# Patient Record
Sex: MALE | Race: WHITE | Employment: FULL TIME | URBAN - METROPOLITAN AREA
[De-identification: names, ages, dates, MRNs, and addresses within clinical notes are randomized per-mention and may not be internally consistent; named-entity substitution may affect disease eponyms.]

---

## 2017-01-03 ENCOUNTER — TELEPHONE (OUTPATIENT)
Dept: CARDIOLOGY CLINIC | Age: 57
End: 2017-01-03

## 2017-01-03 RX ORDER — ROSUVASTATIN CALCIUM 5 MG/1
5 TABLET, COATED ORAL DAILY
Qty: 30 TABLET | Refills: 11 | Status: SHIPPED | OUTPATIENT
Start: 2017-01-03 | End: 2017-01-04 | Stop reason: SDUPTHER

## 2017-01-04 RX ORDER — PRASUGREL 10 MG/1
10 TABLET, FILM COATED ORAL DAILY
Qty: 30 TABLET | Refills: 11 | Status: SHIPPED | OUTPATIENT
Start: 2017-01-04 | End: 2018-01-06 | Stop reason: SDUPTHER

## 2017-01-04 RX ORDER — ROSUVASTATIN CALCIUM 5 MG/1
5 TABLET, COATED ORAL DAILY
Qty: 30 TABLET | Refills: 11 | Status: SHIPPED | OUTPATIENT
Start: 2017-01-04 | End: 2017-01-05

## 2017-01-05 ENCOUNTER — TELEPHONE (OUTPATIENT)
Dept: CARDIOLOGY CLINIC | Age: 57
End: 2017-01-05

## 2017-01-05 RX ORDER — SIMVASTATIN 40 MG
40 TABLET ORAL NIGHTLY
Qty: 30 TABLET | Refills: 11
Start: 2017-01-05 | End: 2017-01-06 | Stop reason: SDUPTHER

## 2017-01-06 RX ORDER — SIMVASTATIN 40 MG
40 TABLET ORAL NIGHTLY
Qty: 30 TABLET | Refills: 11 | Status: SHIPPED | OUTPATIENT
Start: 2017-01-06 | End: 2018-01-06 | Stop reason: SDUPTHER

## 2017-04-29 ENCOUNTER — HOSPITAL ENCOUNTER (OUTPATIENT)
Dept: OTHER | Age: 57
Discharge: HOME | End: 2017-04-29
Attending: INTERNAL MEDICINE | Admitting: INTERNAL MEDICINE

## 2017-04-29 DIAGNOSIS — E78.00 HYPERCHOLESTEREMIA: ICD-10-CM

## 2017-04-29 LAB
CHOLESTEROL, TOTAL: 168 MG/DL (ref 0–199)
HDLC SERPL-MCNC: 43 MG/DL (ref 40–60)
LDL CHOLESTEROL CALCULATED: 104 MG/DL
TRIGL SERPL-MCNC: 105 MG/DL (ref 0–150)
VLDLC SERPL CALC-MCNC: 21 MG/DL

## 2017-05-03 ENCOUNTER — OFFICE VISIT (OUTPATIENT)
Dept: CARDIOLOGY CLINIC | Age: 57
End: 2017-05-03

## 2017-05-03 VITALS
DIASTOLIC BLOOD PRESSURE: 82 MMHG | HEART RATE: 60 BPM | WEIGHT: 210 LBS | SYSTOLIC BLOOD PRESSURE: 112 MMHG | OXYGEN SATURATION: 95 % | BODY MASS INDEX: 28.48 KG/M2

## 2017-05-03 DIAGNOSIS — E78.00 HYPERCHOLESTEREMIA: ICD-10-CM

## 2017-05-03 DIAGNOSIS — I25.10 CORONARY ARTERY DISEASE INVOLVING NATIVE CORONARY ARTERY OF NATIVE HEART WITHOUT ANGINA PECTORIS: Primary | ICD-10-CM

## 2017-05-03 DIAGNOSIS — I10 ESSENTIAL HYPERTENSION: ICD-10-CM

## 2017-05-03 PROCEDURE — 99214 OFFICE O/P EST MOD 30 MIN: CPT | Performed by: NURSE PRACTITIONER

## 2017-05-03 RX ORDER — LISINOPRIL 20 MG/1
20 TABLET ORAL DAILY
Qty: 30 TABLET | Refills: 5 | Status: SHIPPED | OUTPATIENT
Start: 2017-05-03 | End: 2018-01-08 | Stop reason: SDUPTHER

## 2017-11-03 ENCOUNTER — OFFICE VISIT (OUTPATIENT)
Dept: CARDIOLOGY CLINIC | Age: 57
End: 2017-11-03

## 2017-11-03 VITALS
SYSTOLIC BLOOD PRESSURE: 136 MMHG | DIASTOLIC BLOOD PRESSURE: 80 MMHG | WEIGHT: 217 LBS | HEART RATE: 82 BPM | RESPIRATION RATE: 16 BRPM | HEIGHT: 72 IN | BODY MASS INDEX: 29.39 KG/M2

## 2017-11-03 DIAGNOSIS — E78.00 PURE HYPERCHOLESTEROLEMIA: Primary | ICD-10-CM

## 2017-11-03 DIAGNOSIS — I10 ESSENTIAL HYPERTENSION: ICD-10-CM

## 2017-11-03 DIAGNOSIS — I25.10 CORONARY ARTERY DISEASE INVOLVING NATIVE CORONARY ARTERY OF NATIVE HEART WITHOUT ANGINA PECTORIS: ICD-10-CM

## 2017-11-03 PROCEDURE — 99214 OFFICE O/P EST MOD 30 MIN: CPT | Performed by: NURSE PRACTITIONER

## 2017-11-03 NOTE — PROGRESS NOTES
[facility]     Outpatient Follow Up Note    CHIEF COMPLAINT / HPI: Follow Up secondary to coronary artery disease/ HTN/ Hyperlipidemia        mUu Moreno is 60 y.o. male who presents today for a routine follow up related to the above mentioned issues. Subjective:   Since the time of last office visit, the patient admits their symptoms have not changed. Today patient is being seen for a routine follow up visit. Currently the patient denies significant chest pain. There is no associated LUCIANO. The patient is not experiencing palpitations. These symptoms show no change since the last office visit. With regard to medication therapy the patient has been compliant with prescribed regimen. They have tolerated therapy to date. Past Medical History:   Diagnosis Date    Hypertension     Hyperthyroidism      Social History:    History   Smoking Status    Former Smoker    Packs/day: 0.50    Quit date: 10/31/2011   Smokeless Tobacco    Never Used     Current Medications:  Current Outpatient Prescriptions   Medication Sig Dispense Refill    Famotidine (PEPCID AC PO) Take by mouth      lisinopril (PRINIVIL;ZESTRIL) 20 MG tablet Take 1 tablet by mouth daily 30 tablet 5    metoprolol tartrate (LOPRESSOR) 25 MG tablet Take 1 tablet by mouth 2 times daily 60 tablet 5    simvastatin (ZOCOR) 40 MG tablet Take 1 tablet by mouth nightly 30 tablet 11    prasugrel (EFFIENT) 10 MG TABS Take 1 tablet by mouth daily 30 tablet 11    Multiple Vitamins-Minerals (MULTIVITAMIN ADULT PO) Take by mouth      Misc Natural Products (FIBER 7 PO) Take 2 tablets by mouth daily.  aspirin 81 MG EC tablet Take 81 mg by mouth daily. No current facility-administered medications for this visit. REVIEW OF SYSTEMS:   CONSTITUTIONAL: No major weight gain or loss, fatigue, weakness, night sweats or fever. There's been no change in energy level, sleep pattern, or activity level.      HEENT: No new vision difficulties or ringing in the ears. RESPIRATORY: No new SOB, PND, orthopnea or cough. CARDIOVASCULAR: See HPI  GI: No nausea, vomiting, diarrhea, constipation, abdominal pain or changes in bowel habits. : No urinary frequency, urgency, incontinence hematuria or dysuria. SKIN: No cyanosis or skin lesions. MUSCULOSKELETAL: No new muscle or joint pain. NEUROLOGICAL: No syncope or TIA-like symptoms. PSYCHIATRIC: No anxiety, pain, insomnia or depression    Objective:   PHYSICAL EXAM:        VITALS:    Vitals:    11/03/17 1448   BP: 136/80   Pulse: 82   Resp: 16         CONSTITUTIONAL: Cooperative, no apparent distress, and appears well nourished / developed  NEUROLOGIC:  Awake and orientated to person, place and time. PSYCH: Calm affect. SKIN: Warm and dry. HEENT: Sclera non-icteric, normocephalic, neck supple, no elevation of JVP, normal carotid pulses with no bruits and thyroid normal size. LUNGS:  No increased work of breathing and clear to auscultation, no crackles or wheezing. CARDIOVASCULAR:  Regular rate and rhythm with no murmurs, gallops, rubs, or abnormal heart sounds, normal PMI. The apical impulses not displaced. Heart tones are crisp and normal. Cervical veins are not engorged                 JVP less than 8 cm H2O                                                                              The carotid upstroke is normal in amplitude and contour without delay or bruit    ABDOMEN:  Normal bowel sounds, non-distended and non-tender to palpation   EXT: No edema, no calf tenderness. Pulses are present bilaterally.     DATA:    Lab Results   Component Value Date    ALT 24 11/01/2016    AST 22 11/01/2016     Lab Results   Component Value Date    CREATININE 1.0 11/01/2016    BUN 16 11/01/2016     11/01/2016    K 4.1 11/01/2016     11/01/2016    CO2 28 11/01/2016     No results found for: TSH, D9KHLUV, Z9LJYRB, THYROIDAB  Lab Results   Component Value Date    WBC 8.5 02/27/2016    HGB 14.3 02/27/2016    HCT 43.3 02/27/2016    MCV 99.2 02/27/2016     02/27/2016     No components found for: CHLPL  Lab Results   Component Value Date    TRIG 105 04/29/2017    TRIG 131 11/01/2016    TRIG 149 09/28/2013     Lab Results   Component Value Date    HDL 43 04/29/2017    HDL 42 11/01/2016    HDL 46 09/28/2013     Lab Results   Component Value Date    LDLCALC 104 (H) 04/29/2017    LDLCALC 101 (H) 11/01/2016    LDLCALC 107 11/19/2011     Lab Results   Component Value Date    LABVLDL 21 04/29/2017    LABVLDL 26 11/01/2016     Radiology Review:  Pertinent images / reports were reviewed as a part of this visit and reveals the following:  Coronary Angiogram/ PCI of CX: 2/2016  PCI of Cx with 3.5X23 Alpine GERSON to 14atm. Techniques utilized to cross  1)Runthrough/Choice PT/Runthrough HPT/Fielder XT  2)Dual wire  3)Balloon support with 1.5X8  Contrast 210cc  Fluoro  26min     Impression  ~Angiography w/ 99% Cx with ABEL 1-2 flow     Intervention  ~PCI of Cx with 3.5X23 Alpine GERSON to 14atm     ECHO: 2/2016    Summary   -Normal left ventricle size, wall thickness and systolic function with an   estimated ejection fraction of 55%.   -Trivial tricuspid regurgitation. Assessment:   Coronary Artery disease  2/16 ~PCI of Cx with 3.5X23 Alpine GERSON to 14atm  Currently on Lopressor, Lisinopril, Effient, ASA, and Zocor  Patient denies any chest pain    Hypertension  Today's B/P- 136/80  stable  Controlled on current medications    Hyperlipidemia  4/17: Total: 168, HDL: 43, LDL: 104  Currently on Zocor    Patient  is stable since last office visit. Plan:   Continue current medications  Encourage activity and exercise  Follow up in six months    I have addressed the patient's cardiac risk factors and adjusted pharmacologic treatment as needed. In addition, I have reinforced the need for patient directed risk factor modification.     Further evaluation will be based upon the patient's clinical course and testing results. All questions and concerns were addressed to the patient/family. Alternatives to  treatment were discussed. The patient  currently  is not smoking. The risks related to smoking were reviewed with the patient. Recommend maintaining a smoke-free lifestyle. Products available for smoking cessation were discussed. Thank you for allowing to us to participate in the care of [address] [place] [address]

## 2017-11-03 NOTE — COMMUNICATION BODY
[facility]     Outpatient Follow Up Note    CHIEF COMPLAINT / HPI: Follow Up secondary to coronary artery disease/ HTN/ Hyperlipidemia        Leah Noonan is 60 y.o. male who presents today for a routine follow up related to the above mentioned issues. Subjective:   Since the time of last office visit, the patient admits their symptoms have not changed. Today patient is being seen for a routine follow up visit. Currently the patient denies significant chest pain. There is no associated LUCIANO. The patient is not experiencing palpitations. These symptoms show no change since the last office visit. With regard to medication therapy the patient has been compliant with prescribed regimen. They have tolerated therapy to date. Past Medical History:   Diagnosis Date    Hypertension     Hyperthyroidism      Social History:    History   Smoking Status    Former Smoker    Packs/day: 0.50    Quit date: 10/31/2011   Smokeless Tobacco    Never Used     Current Medications:  Current Outpatient Prescriptions   Medication Sig Dispense Refill    Famotidine (PEPCID AC PO) Take by mouth      lisinopril (PRINIVIL;ZESTRIL) 20 MG tablet Take 1 tablet by mouth daily 30 tablet 5    metoprolol tartrate (LOPRESSOR) 25 MG tablet Take 1 tablet by mouth 2 times daily 60 tablet 5    simvastatin (ZOCOR) 40 MG tablet Take 1 tablet by mouth nightly 30 tablet 11    prasugrel (EFFIENT) 10 MG TABS Take 1 tablet by mouth daily 30 tablet 11    Multiple Vitamins-Minerals (MULTIVITAMIN ADULT PO) Take by mouth      Misc Natural Products (FIBER 7 PO) Take 2 tablets by mouth daily.  aspirin 81 MG EC tablet Take 81 mg by mouth daily. No current facility-administered medications for this visit. REVIEW OF SYSTEMS:   CONSTITUTIONAL: No major weight gain or loss, fatigue, weakness, night sweats or fever. There's been no change in energy level, sleep pattern, or activity level.      HEENT: No new vision difficulties or ringing in the ears. RESPIRATORY: No new SOB, PND, orthopnea or cough. CARDIOVASCULAR: See HPI  GI: No nausea, vomiting, diarrhea, constipation, abdominal pain or changes in bowel habits. : No urinary frequency, urgency, incontinence hematuria or dysuria. SKIN: No cyanosis or skin lesions. MUSCULOSKELETAL: No new muscle or joint pain. NEUROLOGICAL: No syncope or TIA-like symptoms. PSYCHIATRIC: No anxiety, pain, insomnia or depression    Objective:   PHYSICAL EXAM:        VITALS:    Vitals:    11/03/17 1448   BP: 136/80   Pulse: 82   Resp: 16         CONSTITUTIONAL: Cooperative, no apparent distress, and appears well nourished / developed  NEUROLOGIC:  Awake and orientated to person, place and time. PSYCH: Calm affect. SKIN: Warm and dry. HEENT: Sclera non-icteric, normocephalic, neck supple, no elevation of JVP, normal carotid pulses with no bruits and thyroid normal size. LUNGS:  No increased work of breathing and clear to auscultation, no crackles or wheezing. CARDIOVASCULAR:  Regular rate and rhythm with no murmurs, gallops, rubs, or abnormal heart sounds, normal PMI. The apical impulses not displaced. Heart tones are crisp and normal. Cervical veins are not engorged                 JVP less than 8 cm H2O                                                                              The carotid upstroke is normal in amplitude and contour without delay or bruit    ABDOMEN:  Normal bowel sounds, non-distended and non-tender to palpation   EXT: No edema, no calf tenderness. Pulses are present bilaterally.     DATA:    Lab Results   Component Value Date    ALT 24 11/01/2016    AST 22 11/01/2016     Lab Results   Component Value Date    CREATININE 1.0 11/01/2016    BUN 16 11/01/2016     11/01/2016    K 4.1 11/01/2016     11/01/2016    CO2 28 11/01/2016     No results found for: TSH, M5NAGML, Z3ACIMH, THYROIDAB  Lab Results   Component Value Date    WBC 8.5 02/27/2016    HGB 14.3 results. All questions and concerns were addressed to the patient/family. Alternatives to  treatment were discussed. The patient  currently  is not smoking. The risks related to smoking were reviewed with the patient. Recommend maintaining a smoke-free lifestyle. Products available for smoking cessation were discussed. Thank you for allowing to us to participate in the care of [address] [place] [address]

## 2017-11-03 NOTE — LETTER
 prasugrel (EFFIENT) 10 MG TABS Take 1 tablet by mouth daily 30 tablet 11    Multiple Vitamins-Minerals (MULTIVITAMIN ADULT PO) Take by mouth      Misc Natural Products (FIBER 7 PO) Take 2 tablets by mouth daily.  aspirin 81 MG EC tablet Take 81 mg by mouth daily. No current facility-administered medications for this visit. REVIEW OF SYSTEMS:   CONSTITUTIONAL: No major weight gain or loss, fatigue, weakness, night sweats or fever. There's been no change in energy level, sleep pattern, or activity level. HEENT: No new vision difficulties or ringing in the ears. RESPIRATORY: No new SOB, PND, orthopnea or cough. CARDIOVASCULAR: See HPI  GI: No nausea, vomiting, diarrhea, constipation, abdominal pain or changes in bowel habits. : No urinary frequency, urgency, incontinence hematuria or dysuria. SKIN: No cyanosis or skin lesions. MUSCULOSKELETAL: No new muscle or joint pain. NEUROLOGICAL: No syncope or TIA-like symptoms. PSYCHIATRIC: No anxiety, pain, insomnia or depression    Objective:   PHYSICAL EXAM:        VITALS:    Vitals:    11/03/17 1448   BP: 136/80   Pulse: 82   Resp: 16         CONSTITUTIONAL: Cooperative, no apparent distress, and appears well nourished / developed  NEUROLOGIC:  Awake and orientated to person, place and time. PSYCH: Calm affect. SKIN: Warm and dry. HEENT: Sclera non-icteric, normocephalic, neck supple, no elevation of JVP, normal carotid pulses with no bruits and thyroid normal size. LUNGS:  No increased work of breathing and clear to auscultation, no crackles or wheezing. CARDIOVASCULAR:  Regular rate and rhythm with no murmurs, gallops, rubs, or abnormal heart sounds, normal PMI. The apical impulses not displaced.  Heart tones are crisp and normal. Cervical veins are not engorged                 JVP less than 8 cm H2O The carotid upstroke is normal in amplitude and contour without delay or bruit    ABDOMEN:  Normal bowel sounds, non-distended and non-tender to palpation   EXT: No edema, no calf tenderness. Pulses are present bilaterally. DATA:    Lab Results   Component Value Date    ALT 24 11/01/2016    AST 22 11/01/2016     Lab Results   Component Value Date    CREATININE 1.0 11/01/2016    BUN 16 11/01/2016     11/01/2016    K 4.1 11/01/2016     11/01/2016    CO2 28 11/01/2016     No results found for: TSH, R8VRISL, U5LMJRS, THYROIDAB  Lab Results   Component Value Date    WBC 8.5 02/27/2016    HGB 14.3 02/27/2016    HCT 43.3 02/27/2016    MCV 99.2 02/27/2016     02/27/2016     No components found for: CHLPL  Lab Results   Component Value Date    TRIG 105 04/29/2017    TRIG 131 11/01/2016    TRIG 149 09/28/2013     Lab Results   Component Value Date    HDL 43 04/29/2017    HDL 42 11/01/2016    HDL 46 09/28/2013     Lab Results   Component Value Date    LDLCALC 104 (H) 04/29/2017    LDLCALC 101 (H) 11/01/2016    LDLCALC 107 11/19/2011     Lab Results   Component Value Date    LABVLDL 21 04/29/2017    LABVLDL 26 11/01/2016     Radiology Review:  Pertinent images / reports were reviewed as a part of this visit and reveals the following:  Coronary Angiogram/ PCI of CX: 2/2016  PCI of Cx with 3.5X23 Alpine GERSON to 14atm. Techniques utilized to cross  1)Runthrough/Choice PT/Runthrough HPT/Fielder XT  2)Dual wire  3)Balloon support with 1.5X8  Contrast 210cc  Fluoro  26min     Impression  ~Angiography w/ 99% Cx with ABEL 1-2 flow     Intervention  ~PCI of Cx with 3.5X23 Alpine GERSON to 14atm     ECHO: 2/2016    Summary   -Normal left ventricle size, wall thickness and systolic function with an   estimated ejection fraction of 55%.   -Trivial tricuspid regurgitation.     Assessment:   Coronary Artery disease  2/16 ~PCI of Cx with 3.5X23 Alpine GERSON to 14atm Currently on Lopressor, Lisinopril, Effient, ASA, and Zocor  Patient denies any chest pain    Hypertension  Today's B/P- 136/80  stable  Controlled on current medications    Hyperlipidemia  4/17: Total: 168, HDL: 43, LDL: 104  Currently on Zocor    Patient  is stable since last office visit. Plan:   Continue current medications  Encourage activity and exercise  Follow up in six months    I have addressed the patient's cardiac risk factors and adjusted pharmacologic treatment as needed. In addition, I have reinforced the need for patient directed risk factor modification. Further evaluation will be based upon the patient's clinical course and testing results. All questions and concerns were addressed to the patient/family. Alternatives to  treatment were discussed. The patient  currently  is not smoking. The risks related to smoking were reviewed with the patient. Recommend maintaining a smoke-free lifestyle. Products available for smoking cessation were discussed. Thank you for allowing to us to participate in the care of [address] [place] [address]. [facility] [place]         If you have questions, please do not hesitate to call me. I look forward to following Scott along with you.     Sincerely,        Diamante Martinez NP

## 2018-01-08 DIAGNOSIS — I10 ESSENTIAL HYPERTENSION: ICD-10-CM

## 2018-01-08 DIAGNOSIS — I25.10 CORONARY ARTERY DISEASE INVOLVING NATIVE CORONARY ARTERY OF NATIVE HEART WITHOUT ANGINA PECTORIS: Primary | ICD-10-CM

## 2018-01-08 RX ORDER — PRASUGREL HCL 10 MG
10 TABLET ORAL DAILY
Qty: 30 TABLET | Refills: 11 | Status: SHIPPED | OUTPATIENT
Start: 2018-01-08 | End: 2019-01-16 | Stop reason: SDUPTHER

## 2018-01-08 RX ORDER — SIMVASTATIN 40 MG
40 TABLET ORAL NIGHTLY
Qty: 30 TABLET | Refills: 11 | Status: SHIPPED | OUTPATIENT
Start: 2018-01-08 | End: 2019-01-19 | Stop reason: SDUPTHER

## 2018-01-08 RX ORDER — LISINOPRIL 20 MG/1
20 TABLET ORAL DAILY
Qty: 30 TABLET | Refills: 5 | Status: SHIPPED | OUTPATIENT
Start: 2018-01-08 | End: 2018-07-18 | Stop reason: SDUPTHER

## 2018-03-02 ENCOUNTER — TELEPHONE (OUTPATIENT)
Dept: CARDIOLOGY CLINIC | Age: 58
End: 2018-03-02

## 2018-03-02 PROBLEM — R07.9 CHEST PAIN: Status: ACTIVE | Noted: 2018-03-02

## 2018-03-02 NOTE — TELEPHONE ENCOUNTER
Pt calling to adv that he is not feeling well he is lightheaded, dizzy, sweaty, clammy and has a little pressure in his chest. Spoke with RNSB and she adv pt to go to ED. Pt adv he will go to [facility].

## 2018-03-14 NOTE — PROGRESS NOTES
mouth      Multiple Vitamins-Minerals (MULTIVITAMIN ADULT PO) Take by mouth      Misc Natural Products (FIBER 7 PO) Take 2 tablets by mouth daily.  aspirin 81 MG EC tablet Take 81 mg by mouth daily. No current facility-administered medications for this visit.       Reviewed with patient and will remain unchanged except as mentioned in A/P  PHYSICAL EXAM     Vitals:    03/16/18 1417   BP: 134/84   Pulse: 80      Gen Alert, coop, no distress Heart  RRR, no MRG, nl apic impulse   Head NC, AT, no abnorm Abd  Soft, NT, +BS, no mass, no OM   Eyes PERRLA, conj/corn clear Ext  Ext nl, AT, no C/C/E   Nose Nares nl, no drain, NT Pulse 2+ and symmetric   Throat Lips, mucosa, tongue nl Skin Color/text/turg nl, no rash/lesions   Neck S/S, TM, NT, no bruit/JVD Psych Nl mood and affect   Lung CTA-B, unlabored, no DTP Lymph   No cervical or axillary LA   Ch wall NT, no deform Neuro  Nl gross M/S exam     ASSESSMENT AND PLAN   ~CAD   Date EF Results   Sx   No concerning   Hx 2/16  SvPCI   LHC 2/16  3/18    PCI of Cx with GERSON, 50% LAD  40% LAD, Cx stent widely patent   GXT 3/16  40% LAD   TTE 2/16 55%    Plan   Continue aggressive medical treatment at doses above    ~HTN  Today BP [x] Controlled [] Borderline [] Uncontrolled   Counseling [x] Diet/Salt [x] Exercise [x] Weight    Plan Continue current medications at doses detailed above  ~Hyperchol  Goal LDL [] <100 [x] <70     Counseling [x] Diet [x] Weight [x] Exercise   Lipid/liver Monitor [x] PCP [] Cardio [] Endocrine   Plan Continue current doses of meds listed above  ~Tobacco  Status [] Smoker [x] Previous smoker   Counseling [x] Risks [x] Cessation   Plan Continued avoidance of first and second hand smoke  ~Followup  []2 wk   []1m   []3m    [x]6m   []12m    []PRN  []Other:

## 2018-03-16 ENCOUNTER — OFFICE VISIT (OUTPATIENT)
Dept: CARDIOLOGY CLINIC | Age: 58
End: 2018-03-16

## 2018-03-16 VITALS
WEIGHT: 220 LBS | HEART RATE: 80 BPM | HEIGHT: 72 IN | BODY MASS INDEX: 29.8 KG/M2 | SYSTOLIC BLOOD PRESSURE: 134 MMHG | DIASTOLIC BLOOD PRESSURE: 84 MMHG

## 2018-03-16 DIAGNOSIS — I10 ESSENTIAL HYPERTENSION: ICD-10-CM

## 2018-03-16 DIAGNOSIS — E78.00 HYPERCHOLESTEREMIA: ICD-10-CM

## 2018-03-16 DIAGNOSIS — I25.10 CORONARY ARTERY DISEASE INVOLVING NATIVE CORONARY ARTERY OF NATIVE HEART WITHOUT ANGINA PECTORIS: Primary | ICD-10-CM

## 2018-03-16 DIAGNOSIS — F17.200 TOBACCO USE DISORDER: ICD-10-CM

## 2018-03-16 PROCEDURE — 99214 OFFICE O/P EST MOD 30 MIN: CPT | Performed by: INTERNAL MEDICINE

## 2018-07-18 DIAGNOSIS — I10 ESSENTIAL HYPERTENSION: ICD-10-CM

## 2018-07-18 DIAGNOSIS — I25.10 CORONARY ARTERY DISEASE INVOLVING NATIVE CORONARY ARTERY OF NATIVE HEART WITHOUT ANGINA PECTORIS: ICD-10-CM

## 2018-07-18 RX ORDER — LISINOPRIL 20 MG/1
20 TABLET ORAL DAILY
Qty: 30 TABLET | Refills: 5 | Status: SHIPPED | OUTPATIENT
Start: 2018-07-18 | End: 2019-01-19 | Stop reason: SDUPTHER

## 2018-11-16 ENCOUNTER — OFFICE VISIT (OUTPATIENT)
Dept: CARDIOLOGY CLINIC | Age: 58
End: 2018-11-16
Payer: COMMERCIAL

## 2018-11-16 VITALS
WEIGHT: 220 LBS | DIASTOLIC BLOOD PRESSURE: 70 MMHG | HEART RATE: 62 BPM | BODY MASS INDEX: 29.8 KG/M2 | SYSTOLIC BLOOD PRESSURE: 120 MMHG | HEIGHT: 72 IN

## 2018-11-16 DIAGNOSIS — I25.10 CORONARY ARTERY DISEASE INVOLVING NATIVE CORONARY ARTERY OF NATIVE HEART WITHOUT ANGINA PECTORIS: ICD-10-CM

## 2018-11-16 DIAGNOSIS — I10 ESSENTIAL HYPERTENSION: ICD-10-CM

## 2018-11-16 DIAGNOSIS — F17.200 TOBACCO USE DISORDER: Primary | ICD-10-CM

## 2018-11-16 PROCEDURE — 99214 OFFICE O/P EST MOD 30 MIN: CPT | Performed by: NURSE PRACTITIONER

## 2018-11-20 NOTE — PROGRESS NOTES
smoke-free lifestyle. Products available for smoking cessation were discussed in detail.     Saturated fat diet discussed  Exercise program discussed      LETI Donnelly [facility]

## 2018-12-29 LAB
ALT SERPL-CCNC: 30 IU/L (ref 10–50)
ANION GAP SERPL CALCULATED.3IONS-SCNC: 11 MMOL/L (ref 6–18)
AST SERPL-CCNC: 22 IU/L (ref 10–50)
BUN BLDV-MCNC: 13 MG/DL (ref 8–26)
CALCIUM SERPL-MCNC: 9.2 MG/DL (ref 8.4–10.2)
CHLORIDE BLD-SCNC: 100 MEQ/L (ref 101–111)
CHOLESTEROL, TOTAL: 198 MG/DL
CHOLESTEROL: 151 MG/DL
CO2: 29 MMOL/L (ref 22–29)
CREAT SERPL-MCNC: 1.01 MG/DL (ref 0.64–1.27)
GFR AFRICAN AMERICAN: 91 ML/MIN/1.73 M2
GFR NON-AFRICAN AMERICAN: 79 ML/MIN/1.73 M2
GLUCOSE BLD-MCNC: 114 MG/DL (ref 70–99)
HDLC SERPL-MCNC: 47 MG/DL
LDL CHOLESTEROL CALCULATED: 125 MG/DL
POTASSIUM SERPL-SCNC: 3.9 MEQ/L (ref 3.6–5.1)
SODIUM BLD-SCNC: 136 MEQ/L (ref 135–145)
TRIGL SERPL-MCNC: 132 MG/DL

## 2018-12-31 ENCOUNTER — TELEPHONE (OUTPATIENT)
Dept: CARDIOLOGY CLINIC | Age: 58
End: 2018-12-31

## 2019-01-10 ENCOUNTER — TELEPHONE (OUTPATIENT)
Dept: CARDIOLOGY CLINIC | Age: 59
End: 2019-01-10

## 2019-01-11 ENCOUNTER — TELEPHONE (OUTPATIENT)
Dept: CARDIOLOGY CLINIC | Age: 59
End: 2019-01-11

## 2019-01-16 RX ORDER — PRASUGREL 10 MG/1
10 TABLET, FILM COATED ORAL DAILY
Qty: 90 TABLET | Refills: 3 | Status: SHIPPED | OUTPATIENT
Start: 2019-01-16 | End: 2019-05-24 | Stop reason: ALTCHOICE

## 2019-01-19 DIAGNOSIS — I25.10 CORONARY ARTERY DISEASE INVOLVING NATIVE CORONARY ARTERY OF NATIVE HEART WITHOUT ANGINA PECTORIS: ICD-10-CM

## 2019-01-19 DIAGNOSIS — I10 ESSENTIAL HYPERTENSION: ICD-10-CM

## 2019-01-21 RX ORDER — LISINOPRIL 20 MG/1
TABLET ORAL
Qty: 30 TABLET | Refills: 5 | Status: SHIPPED | OUTPATIENT
Start: 2019-01-21 | End: 2019-07-16 | Stop reason: SDUPTHER

## 2019-01-21 RX ORDER — SIMVASTATIN 40 MG
40 TABLET ORAL NIGHTLY
Qty: 30 TABLET | Refills: 11 | Status: SHIPPED | OUTPATIENT
Start: 2019-01-21 | End: 2020-02-05 | Stop reason: SDUPTHER

## 2019-02-07 ENCOUNTER — TELEPHONE (OUTPATIENT)
Dept: CARDIOLOGY CLINIC | Age: 59
End: 2019-02-07

## 2019-05-24 ENCOUNTER — OFFICE VISIT (OUTPATIENT)
Dept: CARDIOLOGY CLINIC | Age: 59
End: 2019-05-24
Payer: COMMERCIAL

## 2019-05-24 VITALS
HEART RATE: 72 BPM | BODY MASS INDEX: 29.36 KG/M2 | SYSTOLIC BLOOD PRESSURE: 108 MMHG | DIASTOLIC BLOOD PRESSURE: 72 MMHG | HEIGHT: 72 IN | WEIGHT: 216.8 LBS

## 2019-05-24 DIAGNOSIS — I25.10 CORONARY ARTERY DISEASE INVOLVING NATIVE CORONARY ARTERY OF NATIVE HEART WITHOUT ANGINA PECTORIS: ICD-10-CM

## 2019-05-24 DIAGNOSIS — I10 ESSENTIAL HYPERTENSION: ICD-10-CM

## 2019-05-24 DIAGNOSIS — Z72.0 TOBACCO ABUSE: ICD-10-CM

## 2019-05-24 DIAGNOSIS — E78.00 HYPERCHOLESTEREMIA: Primary | ICD-10-CM

## 2019-05-24 PROCEDURE — 99214 OFFICE O/P EST MOD 30 MIN: CPT | Performed by: INTERNAL MEDICINE

## 2019-05-24 NOTE — PROGRESS NOTES
[address]     H+P // CONSULT // OUTPATIENT VISIT // [facility]ters     Referring Doctor Arcelia Harris MD   Encounter Type Followup     CHIEF COMPLAINT     Visit Type Chronic   Symptoms None   Problems CAD, HTN, CHOL, TOB      HISTORY OF PRESENT ILLNESS      GEN - Doing well. No new concerns.  CAD - Denies cp, sob, dizziness, syncope, palpitations.  HTN - Not taking ambulatory pressures. No HA or dizziness.  CHOL - Last cholesterol reviewed and elevated but not taking statin on regular basiss.  MED - Compliant with CV meds listed below without notable side effects, except statin     COMPLIANCE     Category Meds Diet Salt Exercise Tobacco Alcohol Drugs   Compliant [] [x] [x] [] [] [x] [x]   [x]Counseling given on all above above categories    HISTORY/ALLERGIES/ROS     MedHx:  has a past medical history of Hypertension and Hyperthyroidism. SurgHx:  has a past surgical history that includes Appendectomy; hernia repair; and Cardiac catheterization. SocHx:   reports that he quit smoking about 7 years ago. He smoked 0.50 packs per day. He has never used smokeless tobacco. He reports that he drinks alcohol. He reports that he does not use drugs. FamHx: family history includes Heart Disease in his mother; High Blood Pressure in his mother. Allergies: Patient has no known allergies. ROS:  [x]Full ROS obtained and negative except as mentioned in HPI     MEDICATIONS      Current Outpatient Medications   Medication Sig Dispense Refill    simvastatin (ZOCOR) 40 MG tablet TAKE 1 TABLET BY MOUTH NIGHTLY 30 tablet 11    lisinopril (PRINIVIL;ZESTRIL) 20 MG tablet TAKE 1 TABLET BY MOUTH EVERY DAY 30 tablet 5    prasugrel (EFFIENT) 10 MG TABS TAKE 1 TABLET BY MOUTH DAILY 90 tablet 3    metoprolol tartrate (LOPRESSOR) 25 MG tablet Take 1 tablet by mouth 2 times daily 60 tablet 5    nitroGLYCERIN (NITROSTAT) 0.4 MG SL tablet up to max of 3 total doses. If no relief after 1 dose, call 911.

## 2019-07-16 DIAGNOSIS — I25.10 CORONARY ARTERY DISEASE INVOLVING NATIVE CORONARY ARTERY OF NATIVE HEART WITHOUT ANGINA PECTORIS: ICD-10-CM

## 2019-07-16 DIAGNOSIS — I10 ESSENTIAL HYPERTENSION: ICD-10-CM

## 2019-07-16 RX ORDER — LISINOPRIL 20 MG/1
TABLET ORAL
Qty: 30 TABLET | Refills: 5 | Status: SHIPPED | OUTPATIENT
Start: 2019-07-16 | End: 2020-01-28 | Stop reason: SDUPTHER

## 2019-09-19 ENCOUNTER — TELEPHONE (OUTPATIENT)
Dept: CARDIOLOGY CLINIC | Age: 59
End: 2019-09-19

## 2019-09-19 DIAGNOSIS — I25.10 CORONARY ARTERY DISEASE INVOLVING NATIVE CORONARY ARTERY OF NATIVE HEART WITHOUT ANGINA PECTORIS: ICD-10-CM

## 2019-09-19 DIAGNOSIS — I10 ESSENTIAL HYPERTENSION: ICD-10-CM

## 2020-01-29 RX ORDER — LISINOPRIL 20 MG/1
TABLET ORAL
Qty: 30 TABLET | Refills: 5 | Status: SHIPPED | OUTPATIENT
Start: 2020-01-29 | End: 2020-04-16 | Stop reason: SDUPTHER

## 2020-02-05 RX ORDER — SIMVASTATIN 40 MG
40 TABLET ORAL NIGHTLY
Qty: 30 TABLET | Refills: 1 | Status: SHIPPED | OUTPATIENT
Start: 2020-02-05 | End: 2020-04-10

## 2020-02-05 NOTE — TELEPHONE ENCOUNTER
Medication Refill    Medication needing refilled:  metoprolol tartrate (LOPRESSOR) 25 MG tablet    simvastatin (ZOCOR) 40 MG tablet    Doseage of the medication:    How are you taking this medication (QD, BID, TID, QID, PRN):    30 or 90 day supply called in: 80    Which Pharmacy are we sending the medication to?:    [facility]/pharmacy #[id number]- [place], [place] - [facility] [address] [phone] - F [phone]

## 2020-04-15 ENCOUNTER — TELEPHONE (OUTPATIENT)
Dept: CARDIOLOGY CLINIC | Age: 60
End: 2020-04-15

## 2020-04-15 PROBLEM — I25.10 CORONARY ARTERY DISEASE DUE TO LIPID RICH PLAQUE: Status: ACTIVE | Noted: 2020-04-15

## 2020-04-15 PROBLEM — I25.83 CORONARY ARTERY DISEASE DUE TO LIPID RICH PLAQUE: Status: ACTIVE | Noted: 2020-04-15

## 2020-04-15 NOTE — PROGRESS NOTES
[address]     H+P // CONSULT // OUTPATIENT VISIT // Vaughn Bedolla     Referring Doctor Lola Garrison MD   Encounter Type Followup     CHIEF COMPLAINT     Visit Type Chronic   Symptoms None   Problems CAD, HTN, CHOL, TOB      HISTORY OF PRESENT ILLNESS      GEN - Doing well. No new concerns.  CAD - Denies cp, sob, dizziness, syncope, palpitations.  HTN - Ambulatory bp in good range. No HA or dizziness.  CHOL - Last cholesterol reviewed and elevated but not taking statin on regular basis.  MED - Compliant with CV meds listed below without notable side effects, except statin     HISTORY/ALLERGIES/ROS     MedHx:  has a past medical history of Hypertension and Hyperthyroidism. SurgHx:  has a past surgical history that includes Appendectomy; hernia repair; and Cardiac catheterization. SocHx:   reports that he quit smoking about 8 years ago. His smoking use included cigarettes. He smoked 0.00 packs per day. He has never used smokeless tobacco. He reports current alcohol use. He reports that he does not use drugs. FamHx: family history includes Heart Disease in his mother; High Blood Pressure in his mother. Allergies: Patient has no known allergies. ROS:  [x]Full ROS obtained and negative except as mentioned in HPI     MEDICATIONS      Current Outpatient Medications   Medication Sig Dispense Refill    simvastatin (ZOCOR) 40 MG tablet Take 1 tablet by mouth nightly 90 tablet 3    metoprolol tartrate (LOPRESSOR) 25 MG tablet Take 0.5 tablets by mouth 2 times daily 90 tablet 3    lisinopril (PRINIVIL;ZESTRIL) 20 MG tablet Take 1 tablet by mouth daily TAKE 1 TABLET BY MOUTH EVERY DAY 90 tablet 3    nitroGLYCERIN (NITROSTAT) 0.4 MG SL tablet up to max of 3 total doses.  If no relief after 1 dose, call 911. 25 tablet 3    Famotidine (PEPCID AC PO) Take by mouth      Multiple Vitamins-Minerals (MULTIVITAMIN ADULT PO) Take by mouth      Misc Natural Products (FIBER 7 PO) Take 2 presence of keon Fraser MD). Working as a scribe, Pepe De La Cruz may have prepopulated components of this note with my historical  intellectual property under my direct supervision. Any additions to this intellectual property were performed in my presence and at my direction.   Furthermore, the content and accuracy of this note have been reviewed by keon Fraser MD).  4/17/2020 12:37 PM

## 2020-04-16 ENCOUNTER — OFFICE VISIT (OUTPATIENT)
Dept: CARDIOLOGY CLINIC | Age: 60
End: 2020-04-16
Payer: COMMERCIAL

## 2020-04-16 VITALS — HEART RATE: 70 BPM | DIASTOLIC BLOOD PRESSURE: 70 MMHG | SYSTOLIC BLOOD PRESSURE: 130 MMHG

## 2020-04-16 PROCEDURE — 99214 OFFICE O/P EST MOD 30 MIN: CPT | Performed by: INTERNAL MEDICINE

## 2020-04-16 RX ORDER — LISINOPRIL 20 MG/1
20 TABLET ORAL DAILY
Qty: 90 TABLET | Refills: 3 | Status: SHIPPED | OUTPATIENT
Start: 2020-04-16 | End: 2021-07-06

## 2020-04-16 RX ORDER — SIMVASTATIN 40 MG
40 TABLET ORAL NIGHTLY
Qty: 90 TABLET | Refills: 3 | Status: SHIPPED | OUTPATIENT
Start: 2020-04-16 | End: 2020-10-13

## 2020-08-19 ENCOUNTER — TELEPHONE (OUTPATIENT)
Dept: CARDIOLOGY CLINIC | Age: 60
End: 2020-08-19

## 2020-10-07 ENCOUNTER — TELEPHONE (OUTPATIENT)
Dept: CARDIOLOGY CLINIC | Age: 60
End: 2020-10-07

## 2020-10-13 ENCOUNTER — OFFICE VISIT (OUTPATIENT)
Dept: CARDIOLOGY CLINIC | Age: 60
End: 2020-10-13
Payer: COMMERCIAL

## 2020-10-13 VITALS
WEIGHT: 212.8 LBS | HEIGHT: 72 IN | DIASTOLIC BLOOD PRESSURE: 71 MMHG | HEART RATE: 75 BPM | BODY MASS INDEX: 28.82 KG/M2 | OXYGEN SATURATION: 97 % | SYSTOLIC BLOOD PRESSURE: 122 MMHG

## 2020-10-13 PROCEDURE — 99214 OFFICE O/P EST MOD 30 MIN: CPT | Performed by: NURSE PRACTITIONER

## 2020-10-13 PROCEDURE — 93000 ELECTROCARDIOGRAM COMPLETE: CPT | Performed by: NURSE PRACTITIONER

## 2020-10-13 RX ORDER — ROSUVASTATIN CALCIUM 20 MG/1
20 TABLET, COATED ORAL DAILY
Qty: 90 TABLET | Refills: 1 | Status: SHIPPED | OUTPATIENT
Start: 2020-10-13 | End: 2021-04-15

## 2020-10-13 NOTE — PATIENT INSTRUCTIONS
Change simvastatin to crestor 20 mg nightly. Call if back pain does not improve in the next one to two weeks.     Repeat blood work in 6-8 weeks (around the first week of December)     Schedule appointment with Dr. Erika Arteaga in 6 months

## 2020-10-13 NOTE — PROGRESS NOTES
[address]     Outpatient Follow Up Note    CHIEF COMPLAINT / HPI:  Follow Up secondary to coronary artery disease     Subjective:   Froy Torre is 62 y.o. male who presents today with a history of CAD s/p PCI of Cx 2016, HTN, HLD, prior smoker. Today, Mr. Lex Juarez states he has been feeling good. He is an  and is often lifting/carrying heaving things. He has been noticing some left shoulder pain for a couple weeks and thinks it may be related to his job. His symptom in  when he needed a stent was indigestion after every meal. Mr. Lex Juarez states that his shoulder pain is better today than it was yesterday. He denies chest pain, shortness of breath, palpitations, edema, or dizziness. He states that he often forgets to take his evening medications because he falls asleep on the couch after work. He complains of feeling like there are balled-up wads of toilet paper in his shoes and is worried he has neuropathy. Past Medical History:   Diagnosis Date    Hypertension     Hyperthyroidism      Social History:    Social History     Tobacco Use   Smoking Status Former Smoker    Packs/day: 0.00    Types: Cigarettes    Last attempt to quit: 10/31/2011    Years since quittin.9   Smokeless Tobacco Never Used     Current Medications:  Current Outpatient Medications   Medication Sig Dispense Refill    simvastatin (ZOCOR) 40 MG tablet Take 1 tablet by mouth nightly 90 tablet 3    metoprolol tartrate (LOPRESSOR) 25 MG tablet Take 0.5 tablets by mouth 2 times daily 90 tablet 3    lisinopril (PRINIVIL;ZESTRIL) 20 MG tablet Take 1 tablet by mouth daily TAKE 1 TABLET BY MOUTH EVERY DAY 90 tablet 3    nitroGLYCERIN (NITROSTAT) 0.4 MG SL tablet up to max of 3 total doses.  If no relief after 1 dose, call 911. 25 tablet 3    Famotidine (PEPCID AC PO) Take by mouth      Multiple Vitamins-Minerals (MULTIVITAMIN ADULT PO) Take by mouth      Misc Natural Products (FIBER 7 PO) Take 2 tablets by mouth daily.  aspirin 81 MG EC tablet Take 81 mg by mouth daily. No current facility-administered medications for this visit. REVIEW OF SYSTEMS:    CONSTITUTIONAL: No major weight gain or loss, fatigue, weakness, night sweats or fever. HEENT: No new vision difficulties or ringing in the ears. RESPIRATORY: No new SOB, PND, orthopnea or cough. CARDIOVASCULAR: See HPI  GI: No nausea, vomiting, diarrhea, constipation, abdominal pain or changes in bowel habits. : No urinary frequency, urgency, incontinence hematuria or dysuria. SKIN: No cyanosis or skin lesions. MUSCULOSKELETAL: No new muscle or joint pain. NEUROLOGICAL: No syncope or TIA-like symptoms. PSYCHIATRIC: No anxiety, pain, insomnia or depression    Objective:   PHYSICAL EXAM:        VITALS:  /71 (Site: Left Upper Arm, Position: Sitting, Cuff Size: Large Adult)   Pulse 75   Ht 6' (1.829 m)   Wt 212 lb 12.8 oz (96.5 kg)   SpO2 97%   BMI 28.86 kg/m²   CONSTITUTIONAL: Cooperative, no apparent distress, and appears well nourished / developed  NEUROLOGIC:  Awake and orientated to person, place and time. PSYCH: Calm affect. SKIN: Warm and dry. HEENT: Sclera non-icteric, normocephalic, neck supple, no elevation of JVP, normal carotid pulses with no bruits and thyroid normal size. LUNGS:  No increased work of breathing and clear to auscultation, no crackles or wheezing  CARDIOVASCULAR:  Regular rate and rhythm with no murmurs, gallops, rubs, or abnormal heart sounds, normal PMI. The apical impulses not displaced   ABDOMEN:  Normal bowel sounds, non-distended and non-tender to palpation Rounded  EXT: No edema, no calf tenderness. Pulses are present bilaterally.     DATA:    Lab Results   Component Value Date    ALT 25 09/19/2020    AST 20 09/19/2020     Lab Results   Component Value Date    CREATININE 1.01 12/29/2018    BUN 13 12/29/2018     12/29/2018    K 3.9 12/29/2018     (L) 12/29/2018    CO2 29 12/29/2018 No results found for: TSH, H8THUII, B3RAVDL, THYROIDAB  Lab Results   Component Value Date    WBC 6.8 03/03/2018    HGB 14.9 03/03/2018    HCT 44.1 03/03/2018    MCV 97.8 03/03/2018     03/03/2018     No components found for: CHLPL  Lab Results   Component Value Date    TRIG 110 09/19/2020    TRIG 132 12/29/2018    TRIG 105 04/29/2017     Lab Results   Component Value Date    HDL 49 (L) 09/19/2020    HDL 47 (L) 12/29/2018    HDL 43 04/29/2017     Lab Results   Component Value Date    LDLCALC 122 09/19/2020    LDLCALC 125 12/29/2018    LDLCALC 104 (H) 04/29/2017     Lab Results   Component Value Date    LABVLDL 21 04/29/2017    LABVLDL 26 11/01/2016      No results found for: BNP  Radiology Review:  Pertinent images / reports were reviewed as a part of this visit and reveals the following:    Last Echo 2/25/2016:    Summary   -Normal left ventricle size, wall thickness and systolic function with an   estimated ejection fraction of 55%. -Trivial tricuspid regurgitation. Last Stress Test 3/9/2016:  Nondiagnostic stress EKG due to failure to reach target heart rate     Stress Test 9/25/2014:  Positive 1.5 mm ST depression inferolateral, horizontal, peak, HR. Resolves quickly in recovery phase. Adequate stress study, no angina, fair exercise tolerance. Normal hemodynamic response. Mild positive ischemic response ECG, quick recovery resolution. Duke score +1 , intermediate risk study. Last Angiogram 3/5/2018:  Non obstructie CAD, unchanged from 2018  Patent stent in the Circ    Last ECG:  Normal sinus rhythm  Normal ECG  When compared to ECG of 26-FEB-2016  T Wave inversion no longer evident in inferior leads     Assessment:      Diagnosis Orders   1. Coronary artery disease involving native coronary artery of native heart without angina pectoris   ~EKG today: Sinus rhythm   ~s/p PCI to Cx in 2016  ~On ASA / BB / Statin  ~stable, no complaints of angina    2.  Essential hypertension   ~Controlled

## 2020-10-15 ENCOUNTER — TELEPHONE (OUTPATIENT)
Dept: CARDIOLOGY CLINIC | Age: 60
End: 2020-10-15

## 2020-10-15 NOTE — TELEPHONE ENCOUNTER
Patient wife calling and stating Dr Kate Ward Stated there would be no change medications when labs were completed - wants a good reason why the stain was changed to a new one and why the diffrence in mg ???

## 2020-10-15 NOTE — TELEPHONE ENCOUNTER
Wife calling to get an update on pt recent visit.  She was upset because pt cholesterol  med was changed and she felt the the nurse practiioner should have consulted with DCE before changing because DCE said that he wasn't going to change his meds , when he saw him last.

## 2021-04-05 NOTE — PROGRESS NOTES
Natural Products (FIBER 7 PO) Take 2 tablets by mouth daily.  aspirin 81 MG EC tablet Take 81 mg by mouth daily. No current facility-administered medications for this visit.       Reviewed with patient and will remain unchanged except as mentioned in A/P  PHYSICAL EXAM     Vitals:    04/07/21 1558   BP: 136/78   Pulse: 76   SpO2: 96%      Gen Alert, coop, no distress Heart  Rr, nomrg   Head NC, AT, no abnorm Abd  Soft, NT, +BS, no mass, no OM   Eyes PER, conj/corn clear Ext  Ext nl, AT, no C/C/E   Nose Nares nl, no drain, NT Pulse 2+ and symmetric   Throat Lips, mucosa, tongue nl Skin Col/text/turg nl, no vis rash/les   Neck S/S, TM, NT, no bruit/JVD Psych Nl mood and affect   Lung CTA-B, unlabored, no DTP Lymph   No cervical or axillary LA   Ch wall NT, no deform Neuro  Nl gross M/S exam     ASSESSMENT AND PLAN     *CAD   Date EF Results   Sx   No concerning   Hx 2/16  SvPCI   LHC 2/16  3/18    PCI of Cx with GERSON, 50% LAD  40% LAD, Cx stent widely patent   GXT 3/16  40% LAD   TTE 2/16 55%    Plan   Continue aggressive medical treatment at doses above   *HTN  Status Controlled, vitals and available ambulatory monitoring logs reviewed personally  Plan Counseled on diet/salt/exercise/weight, continue meds at doses above  *CHOL  Status  Uncontrolled with last LDL of 122 (goal <70) and HDL of 49 (9/20), labs reviewed personally  Plan Counseled on diet/exercise/weight, continue high-intensity statin, lipid/liver surveillance per PCP  *TOB  Status Quit  Plan Continued avoidance of 1st and 2nd hand smoke, counseled on risks/cessation  *COMPLIANCE  Status Compliant  Plan Discussed importance of compliance with meds/diet/salt/exercise; avoid tob/alc/drugs; patient verbalized understanding  *FOLLOWUP  12 months    [address] PINEDA, Moy Bell, am scribing for and in the presence of Letitia Leahy MD.   Sergio, Moy Bell 04/05/21 12:28 PM   Provider Armen Lazo is working as a scribe for and in the presence of me Ward Lynn MD). Working as a scribe, Samira Fox may have prepopulated components of this note with my historical  intellectual property under my direct supervision. Any additions to this intellectual property were performed in my presence and at my direction. Furthermore, the content and accuracy of this note have been reviewed by me Ward Lynn MD).  4/7/2021 4:01 PM    CODING     Category Diagnosis   Stable chronic illness  (93824/78867 - 2 or more) CAD, HTN, CHOL, TOB   Chronic illness with: Exac, progr or SA of Tx  (74780/70035 - 1 or more)    Undiagnosed new problem with: uncertain prognosis  (38567/64838 - 1 or more)    Acute illness with systemic Sx  (44965/51115 - 1 or more)    Acute, complicated injury  (97059/02443 - 1 or more)    90043 1 or more chronic illness with exacerbation, progression or SA of treatment    Time  30-39 minutes spent preparing to see patient including reviewing patient history/prior tests/prior consults, performing a medical exam, counseling and educating patient/family/caregiver, ordering medications/tests/procedures, referring and communicating with PCPs and other pertinent consultants, documenting information in the EMR, independently interpreting results and communicating to family and coordination of patient care.

## 2021-04-06 ENCOUNTER — TELEPHONE (OUTPATIENT)
Dept: CARDIOLOGY CLINIC | Age: 61
End: 2021-04-06

## 2021-04-06 NOTE — TELEPHONE ENCOUNTER
Pt wife calling asking that the lab orders be faxed to [facility] in  [facility] fax [phone] pls advice thank you

## 2021-04-07 ENCOUNTER — OFFICE VISIT (OUTPATIENT)
Dept: CARDIOLOGY CLINIC | Age: 61
End: 2021-04-07
Payer: COMMERCIAL

## 2021-04-07 VITALS
OXYGEN SATURATION: 96 % | DIASTOLIC BLOOD PRESSURE: 78 MMHG | HEART RATE: 76 BPM | BODY MASS INDEX: 29.8 KG/M2 | HEIGHT: 72 IN | SYSTOLIC BLOOD PRESSURE: 136 MMHG | WEIGHT: 220 LBS

## 2021-04-07 DIAGNOSIS — I25.10 CORONARY ARTERY DISEASE DUE TO LIPID RICH PLAQUE: Primary | ICD-10-CM

## 2021-04-07 DIAGNOSIS — I10 ESSENTIAL HYPERTENSION: ICD-10-CM

## 2021-04-07 DIAGNOSIS — Z72.0 TOBACCO ABUSE: ICD-10-CM

## 2021-04-07 DIAGNOSIS — E78.00 HYPERCHOLESTEREMIA: ICD-10-CM

## 2021-04-07 DIAGNOSIS — I25.83 CORONARY ARTERY DISEASE DUE TO LIPID RICH PLAQUE: Primary | ICD-10-CM

## 2021-04-07 PROCEDURE — 99214 OFFICE O/P EST MOD 30 MIN: CPT | Performed by: INTERNAL MEDICINE

## 2021-04-07 NOTE — LETTER
[address] [facility] [address] [address]  Phone: [phone]  Fax: [phone]    Gabriella Fischer MD        April 16, 2021     Moise Bloch, MD  [id number]    Patient: Preethi Greene  MR Number: [id number]  YOB: 1960  Date of Visit: 4/7/2021    Dear Dr. Moise Bloch:      [address]     H+P // CONSULT // OUTPATIENT VISIT // [facility]     Referring Doctor Moise Bloch, MD   Encounter Type Followup     CHIEF COMPLAINT     Visit Type Chronic   Symptoms None   Problems CAD, HTN, CHOL, TOB      HISTORY OF PRESENT ILLNESS      GEN - Doing well. No new concerns.  CAD - Denies cp, sob, dizziness, syncope, palpitations.  HTN - Ambulatory bp in good range. No HA or dizziness.  CHOL - Last cholesterol reviewed and elevated but not taking statin on regular basis.  MED - Compliant with CV meds listed below without notable side effects, except statin     HISTORY/ALLERGIES/ROS     MedHx:  has a past medical history of Hypertension and Hyperthyroidism. SurgHx:  has a past surgical history that includes Appendectomy; hernia repair; and Cardiac catheterization. SocHx:   reports that he quit smoking about 9 years ago. His smoking use included cigarettes. He smoked 0.00 packs per day. He has never used smokeless tobacco. He reports current alcohol use. He reports that he does not use drugs. FamHx: family history includes Heart Disease in his mother; High Blood Pressure in his mother. Allergies: Patient has no known allergies.    ROS:  [x]Full ROS obtained and negative except as mentioned in HPI     MEDICATIONS      Current Outpatient Medications   Medication Sig Dispense Refill    Acetaminophen (TYLENOL PO) Take by mouth      rosuvastatin (CRESTOR) 20 MG tablet Take 1 tablet by mouth daily 90 tablet 1    metoprolol tartrate (LOPRESSOR) 25 MG tablet Take 0.5 tablets by mouth 2 times daily 90 tablet 3    lisinopril (PRINIVIL;ZESTRIL) 20 MG tablet Take 1 tablet by mouth daily TAKE 1 TABLET BY MOUTH EVERY DAY 90 tablet 3    nitroGLYCERIN (NITROSTAT) 0.4 MG SL tablet up to max of 3 total doses. If no relief after 1 dose, call 911. 25 tablet 3    Famotidine (PEPCID AC PO) Take by mouth      Multiple Vitamins-Minerals (MULTIVITAMIN ADULT PO) Take by mouth      Misc Natural Products (FIBER 7 PO) Take 2 tablets by mouth daily.  aspirin 81 MG EC tablet Take 81 mg by mouth daily. No current facility-administered medications for this visit.       Reviewed with patient and will remain unchanged except as mentioned in A/P  PHYSICAL EXAM     Vitals:    04/07/21 1558   BP: 136/78   Pulse: 76   SpO2: 96%      Gen Alert, coop, no distress Heart  Rr, nomrg   Head NC, AT, no abnorm Abd  Soft, NT, +BS, no mass, no OM   Eyes PER, conj/corn clear Ext  Ext nl, AT, no C/C/E   Nose Nares nl, no drain, NT Pulse 2+ and symmetric   Throat Lips, mucosa, tongue nl Skin Col/text/turg nl, no vis rash/les   Neck S/S, TM, NT, no bruit/JVD Psych Nl mood and affect   Lung CTA-B, unlabored, no DTP Lymph   No cervical or axillary LA   Ch wall NT, no deform Neuro  Nl gross M/S exam     ASSESSMENT AND PLAN     *CAD   Date EF Results   Sx   No concerning   Hx 2/16  SvPCI   LHC 2/16  3/18    PCI of Cx with GERSON, 50% LAD  40% LAD, Cx stent widely patent   GXT 3/16  40% LAD   TTE 2/16 55%    Plan   Continue aggressive medical treatment at doses above   *HTN  Status Controlled, vitals and available ambulatory monitoring logs reviewed personally  Plan Counseled on diet/salt/exercise/weight, continue meds at doses above  *CHOL  Status  Uncontrolled with last LDL of 122 (goal <70) and HDL of 49 (9/20), labs reviewed personally  Plan Counseled on diet/exercise/weight, continue high-intensity statin, lipid/liver surveillance per PCP  *TOB  Status Quit  Plan Continued avoidance of 1st and 2nd hand smoke, counseled on risks/cessation  *COMPLIANCE  Status Compliant  Plan Discussed importance of compliance with meds/diet/salt/exercise; avoid tob/alc/drugs; patient verbalized understanding  *FOLLOWUP  12 months    [address], am scribing for and in the presence of Sawyer Holly MD.   SignedJunior 04/05/21 12:28 PM   Provider Fredy Feng is working as a scribe for and in the presence of keon Holly MD). Working as a scribe, Junior Sandhu may have prepopulated components of this note with my historical  intellectual property under my direct supervision. Any additions to this intellectual property were performed in my presence and at my direction. Furthermore, the content and accuracy of this note have been reviewed by keon Holly MD).  4/7/2021 4:01 PM    CODING     Category Diagnosis   Stable chronic illness  (12826/95267 - 2 or more) CAD, HTN, CHOL, TOB   Chronic illness with: Exac, progr or SA of Tx  (16085/75659 - 1 or more)    Undiagnosed new problem with: uncertain prognosis  (60374/76137 - 1 or more)    Acute illness with systemic Sx  (23015/37550 - 1 or more)    Acute, complicated injury  (33331/34889 - 1 or more)    85921 1 or more chronic illness with exacerbation, progression or SA of treatment    Time  30-39 minutes spent preparing to see patient including reviewing patient history/prior tests/prior consults, performing a medical exam, counseling and educating patient/family/caregiver, ordering medications/tests/procedures, referring and communicating with PCPs and other pertinent consultants, documenting information in the EMR, independently interpreting results and communicating to family and coordination of patient care. If you have questions, please do not hesitate to call me. I look forward to following Scott along with you.     Sincerely,        Arnie Arambula MD

## 2021-04-15 ENCOUNTER — TELEPHONE (OUTPATIENT)
Dept: CARDIOLOGY CLINIC | Age: 61
End: 2021-04-15

## 2021-04-15 DIAGNOSIS — E78.2 MIXED HYPERLIPIDEMIA: Primary | ICD-10-CM

## 2021-04-15 RX ORDER — ROSUVASTATIN CALCIUM 40 MG/1
40 TABLET, COATED ORAL DAILY
Qty: 90 TABLET | Refills: 2 | Status: SHIPPED | OUTPATIENT
Start: 2021-04-15

## 2021-04-15 NOTE — TELEPHONE ENCOUNTER
Call placed to patient 3 times with all times his phone rang busy. Will need to try again later or tomorrow.

## 2021-04-15 NOTE — TELEPHONE ENCOUNTER
LDL improved on rosuvastatin (crestor). However, it is still higher than we would like. It is 98 and we would like it to be <70 with his history of CAD. I would like him to increase crestor to 40 mg daily. I will send in a new prescription to [facility]. I would like him to repeat fasting labs in 8 weeks to see if LDL improved. Order in [address] [place] [address] Rd.

## 2021-04-16 RX ORDER — ROSUVASTATIN CALCIUM 20 MG/1
TABLET, COATED ORAL
Qty: 90 TABLET | Refills: 1 | OUTPATIENT
Start: 2021-04-16

## 2021-04-17 DIAGNOSIS — I10 ESSENTIAL HYPERTENSION: ICD-10-CM

## 2021-04-17 DIAGNOSIS — I25.10 CORONARY ARTERY DISEASE INVOLVING NATIVE CORONARY ARTERY OF NATIVE HEART WITHOUT ANGINA PECTORIS: ICD-10-CM

## 2021-04-19 NOTE — TELEPHONE ENCOUNTER
Received refill request for metoprolol from [facility] pharmacy.     Last ov:4/7/2021 DCE    Last Refill:4/16/2020 #90 with 3 refills    Next appointment:on recall list with DCE for 2022

## 2021-04-22 NOTE — TELEPHONE ENCOUNTER
FYI: Spoke w/ pt's wife. Patient saw Dr Jada Rangel and was told his labs looked good. Patient will continue Crestor 20mg daily. I advised her to have him cut Crestor 40mg tabs that were sent to pharmacy in half. She verbalized understanding.

## 2021-05-04 ENCOUNTER — TELEPHONE (OUTPATIENT)
Dept: CARDIOLOGY CLINIC | Age: 61
End: 2021-05-04

## 2021-05-04 NOTE — TELEPHONE ENCOUNTER
Pt wife calling, her  came home from work yesterday with his legs and feet swollen, she also says he has gained 8 pounds in 2 weeks.  What should he do? pls call to advise thank you

## 2021-05-07 ENCOUNTER — OFFICE VISIT (OUTPATIENT)
Dept: CARDIOLOGY CLINIC | Age: 61
End: 2021-05-07
Payer: COMMERCIAL

## 2021-05-07 VITALS
OXYGEN SATURATION: 97 % | SYSTOLIC BLOOD PRESSURE: 130 MMHG | HEIGHT: 72 IN | WEIGHT: 222.4 LBS | BODY MASS INDEX: 30.12 KG/M2 | HEART RATE: 82 BPM | DIASTOLIC BLOOD PRESSURE: 78 MMHG

## 2021-05-07 DIAGNOSIS — I25.10 CORONARY ARTERY DISEASE INVOLVING NATIVE CORONARY ARTERY OF NATIVE HEART WITHOUT ANGINA PECTORIS: Primary | ICD-10-CM

## 2021-05-07 DIAGNOSIS — R60.9 SWELLING: ICD-10-CM

## 2021-05-07 DIAGNOSIS — I10 ESSENTIAL HYPERTENSION: ICD-10-CM

## 2021-05-07 DIAGNOSIS — E78.2 MIXED HYPERLIPIDEMIA: ICD-10-CM

## 2021-05-07 PROCEDURE — 99214 OFFICE O/P EST MOD 30 MIN: CPT | Performed by: NURSE PRACTITIONER

## 2021-05-07 NOTE — PROGRESS NOTES
[address]     Outpatient Follow Up Note    CHIEF COMPLAINT / HPI:  Follow Up secondary to coronary artery disease     Subjective:   Yvonne Carr is 62 y.o. male who presents today with a history of CAD s/p PCI of Cx 2016, HTN, HLD, prior smoker. Today, Mr Cisco Jones states that last week he noticed BLE swelling that only lasted three days. He ate some potato chips and olives prior to the swelling starting which he normally doesn't eat. Mr Cisco Jones denies shortness of breath and is able to ride his bike and run around with grand kids without issue. He denies chest pain, indigestion, or dizziness. Past Medical History:   Diagnosis Date    Hypertension     Hyperthyroidism      Social History:    Social History     Tobacco Use   Smoking Status Former Smoker    Packs/day: 0.00    Types: Cigarettes    Quit date: 10/31/2011    Years since quittin.5   Smokeless Tobacco Never Used     Current Medications:  Current Outpatient Medications   Medication Sig Dispense Refill    metoprolol tartrate (LOPRESSOR) 25 MG tablet TAKE 1/2 TABLET BY MOUTH TWICE A DAY**NEED LAB WORK** 90 tablet 3    rosuvastatin (CRESTOR) 40 MG tablet Take 1 tablet by mouth daily 90 tablet 2    Acetaminophen (TYLENOL PO) Take by mouth      lisinopril (PRINIVIL;ZESTRIL) 20 MG tablet Take 1 tablet by mouth daily TAKE 1 TABLET BY MOUTH EVERY DAY 90 tablet 3    nitroGLYCERIN (NITROSTAT) 0.4 MG SL tablet up to max of 3 total doses. If no relief after 1 dose, call 911. 25 tablet 3    Famotidine (PEPCID AC PO) Take by mouth      Multiple Vitamins-Minerals (MULTIVITAMIN ADULT PO) Take by mouth      Misc Natural Products (FIBER 7 PO) Take 2 tablets by mouth daily.  aspirin 81 MG EC tablet Take 81 mg by mouth daily. No current facility-administered medications for this visit. REVIEW OF SYSTEMS:    CONSTITUTIONAL: No major weight gain or loss, fatigue, weakness, night sweats or fever.   HEENT: No new vision difficulties or ringing in the ears. RESPIRATORY: No new SOB, PND, orthopnea or cough. CARDIOVASCULAR: See HPI  GI: No nausea, vomiting, diarrhea, constipation, abdominal pain or changes in bowel habits. : No urinary frequency, urgency, incontinence hematuria or dysuria. SKIN: No cyanosis or skin lesions. MUSCULOSKELETAL: No new muscle or joint pain. NEUROLOGICAL: No syncope or TIA-like symptoms. PSYCHIATRIC: No anxiety, pain, insomnia or depression    Objective:   PHYSICAL EXAM:        VITALS:  /78 (Site: Left Upper Arm, Position: Sitting, Cuff Size: Medium Adult)   Pulse 82   Ht 6' (1.829 m)   Wt 222 lb 6.4 oz (100.9 kg)   SpO2 97%   BMI 30.16 kg/m²   CONSTITUTIONAL: Cooperative, no apparent distress, and appears well nourished / developed  NEUROLOGIC:  Awake and orientated to person, place and time. PSYCH: Calm affect. SKIN: Warm and dry. HEENT: Sclera non-icteric, normocephalic, neck supple, no elevation of JVP, normal carotid pulses with no bruits and thyroid normal size. LUNGS:  No increased work of breathing and clear to auscultation, no crackles or wheezing  CARDIOVASCULAR:  Regular rate and rhythm with no murmurs, gallops, rubs, or abnormal heart sounds, normal PMI. The apical impulses not displaced   ABDOMEN:  Normal bowel sounds, non-distended and non-tender to palpation Rounded  EXT: No edema, no calf tenderness. Pulses are present bilaterally.     DATA:    Lab Results   Component Value Date    ALT 25 09/19/2020    AST 20 09/19/2020     Lab Results   Component Value Date    CREATININE 1.01 12/29/2018    BUN 13 12/29/2018     12/29/2018    K 3.9 12/29/2018     (L) 12/29/2018    CO2 29 12/29/2018     No results found for: TSH, X1JKDHW, L0XPHVR, THYROIDAB  Lab Results   Component Value Date    WBC 6.8 03/03/2018    HGB 14.9 03/03/2018    HCT 44.1 03/03/2018    MCV 97.8 03/03/2018     03/03/2018     No components found for: CHLPL  Lab Results   Component Value Date TRIG 110 09/19/2020    TRIG 132 12/29/2018    TRIG 105 04/29/2017     Lab Results   Component Value Date    HDL 49 (L) 09/19/2020    HDL 47 (L) 12/29/2018    HDL 43 04/29/2017     Lab Results   Component Value Date    LDLCALC 122 09/19/2020    LDLCALC 125 12/29/2018    LDLCALC 104 (H) 04/29/2017     Lab Results   Component Value Date    LABVLDL 21 04/29/2017    LABVLDL 26 11/01/2016      No results found for: BNP  Radiology Review:  Pertinent images / reports were reviewed as a part of this visit and reveals the following:    Last Echo 2/25/2016:    Summary   -Normal left ventricle size, wall thickness and systolic function with an   estimated ejection fraction of 55%. -Trivial tricuspid regurgitation. Last Stress Test 3/9/2016:  Nondiagnostic stress EKG due to failure to reach target heart rate     Stress Test 9/25/2014:  Positive 1.5 mm ST depression inferolateral, horizontal, peak, HR. Resolves quickly in recovery phase. Adequate stress study, no angina, fair exercise tolerance. Normal hemodynamic response. Mild positive ischemic response ECG, quick recovery resolution. Duke score +1 , intermediate risk study. Last Angiogram 3/5/2018:  Non obstructie CAD, unchanged from 2018  Patent stent in the Circ    Last ECG:  Normal sinus rhythm  Normal ECG  When compared to ECG of 26-FEB-2016  T Wave inversion no longer evident in inferior leads     Assessment:      Diagnosis Orders   1. Coronary artery disease involving native coronary artery of native heart without angina pectoris   ~EKG today: Sinus rhythm   ~s/p PCI to Cx in 2016  ~On ASA / BB / Statin  ~stable, no complaints of angina    2. Essential hypertension   ~Controlled on lisinopril, metoprolol  ~BP today in office 122/71    3. Mixed hyperlipidemia   ~Lipids (9/19/20): , HDL 49, , Trig 110  ~On rosuvastatin 20     4.       Swelling           ~ resolved     I had the opportunity to review the clinical symptoms and presentation of Scott Aldridge. Plan:     1. Repeat echocardiogram  2. Watch salt intake. Encouraged compression stockings to be worn during the day and removed at night  3. Patient on call-back list for Dr. Ginna Falcon April of 2022    Overall the patient is stable from CV standpoint    I have addresed the patient's cardiac risk factors and adjusted pharmacologic treatment as needed. In addition, I have reinforced the need for patient directed risk factor modification. Further evaluation will be based upon the patient's clinical course and testing results. All questions and concerns were addressed to the patient. The patient is not currently smoking. The risks related to smoking were reviewed with the patient. Recommend maintaining a smoke-free lifestyle. Patient is on a beta-blocker  Patient is on an ace-i/ARB  Patient is on a statin    Dual Antiplatelet therapy / anti-coagulation has not been recommended / prescribed for this patient. Education conducted on adverse reactions including bleeding was discussed. The patient verbalizes understanding not to stop medications without discussing with us. Discussed exercise: 30-60 minutes 7 days/week. Encouraged regular exercise. Discussed Low saturated fat/PATY diet. Thank you for allowing to us to participate in the care of [address] [place] [address].     Electronically signed by MARLA Hackett CNP on 5/7/2021 at 12:26 PM     Documentation of today's visit sent to PCP

## 2021-05-07 NOTE — PATIENT INSTRUCTIONS
Schedule echocardiogram    On call back last to see Dr. Philipp Gerber in April of 2022    Watch your salt intake, wear compression stockings during the day, take them off at night.

## 2021-06-15 ENCOUNTER — TELEPHONE (OUTPATIENT)
Dept: CARDIOLOGY CLINIC | Age: 61
End: 2021-06-15

## 2021-06-15 NOTE — TELEPHONE ENCOUNTER
CARDIAC CLEARANCE     What type of procedure are you having? RIGHT KNEE ARTHROSCOPY WITH MEDIAL MENISCUS ROOT REPAIR    Which physician is performing your procedure? Dr Juan Mane    When is your procedure scheduled for? 08/02/2021    Where are you having this procedure? [facility]    Are you taking Blood Thinners?yes,    If so what? ASA  (Name/dose/frequesncy)     Does the surgeon want you to stop your blood thinner? If so for how long? Can he stop 7 days prior?      Phone Number and Contact Name for Physicians office: [phone]    Fax number to send information: [phone]

## 2021-07-03 DIAGNOSIS — I25.10 CORONARY ARTERY DISEASE INVOLVING NATIVE CORONARY ARTERY OF NATIVE HEART WITHOUT ANGINA PECTORIS: ICD-10-CM

## 2021-07-03 DIAGNOSIS — I10 ESSENTIAL HYPERTENSION: ICD-10-CM

## 2021-07-06 RX ORDER — LISINOPRIL 20 MG/1
TABLET ORAL
Qty: 90 TABLET | Refills: 3 | Status: SHIPPED | OUTPATIENT
Start: 2021-07-06 | End: 2022-07-25

## 2021-07-06 NOTE — TELEPHONE ENCOUNTER
Received refill request for lisinopril from [facility] pharmacy.     Last ov: 5/7/2021 NPKA    Last Refill: 4/16/2020 #90 with 3 refills    Next appointment:on recall list with [facility] 2022

## 2021-07-28 NOTE — PROGRESS NOTES
Name_______________________________________Printed:____________________  Date and time of surgery__8/2/21  0700______________________Arrival TDHY:_0713  [facility]_______________   1. The instructions given regarding when and if a patient needs to stop oral intake prior to surgery varies. Follow the specific instructions you were given                 XXX ___Nothing to eat or to drink after Midnight the night before.                   ____Carbo loading or ERAS instructions will be given to select patients-if you have been given those instructions -please do the following                           The evening before your surgery after dinner before midnight drink 40 ounces of gatorade. If you are diabetic use sugar free. The morning of surgery drink 40 ounces of water. This needs to be finished 3 hours prior to your surgery start time. 2. Take the following pills with a small sip of water on the morning of surgery____take metoprolol am of procedure_______________________________________________                  Do not take blood pressure medications ending in pril or sartan the flori prior to surgery or the morning of surgery-DO NOT TAKE LISINOPRIL   3. Aspirin, Ibuprofen, Advil, Naproxen, Vitamin E and other Anti-inflammatory products and supplements should be stopped for 5 -7days before surgery or as directed by your physician-CHECK ASA--remain on asa per cardiology unless contraindicated--check with Dr. Hattie Cervantes. 4. Check with your Doctor regarding stopping Plavix, Coumadin,Eliquis, Lovenox,Effient,Pradaxa,Xarelto, Fragmin or other blood thinners and follow their instructions. 5. Do not smoke, and do not drink any alcoholic beverages 24 hours prior to surgery. This includes NA Beer. Refrain from the usage of any recreational drugs. 6. You may brush your teeth and gargle the morning of surgery. DO NOT SWALLOW WATER   7.  You MUST make arrangements for a responsible adult to stay on site while you are here and take you home after your surgery. You will not be allowed to leave alone or drive yourself home. It is strongly suggested someone stay with you the first 24 hrs. Your surgery will be cancelled if you do not have a ride home. 8. A parent/legal guardian must accompany a child scheduled for surgery and plan to stay at the hospital until the child is discharged. Please do not bring other children with you. 9. Please wear simple, loose fitting clothing to the hospital.  Mik Sterling not bring valuables (money, credit cards, checkbooks, etc.) Do not wear any makeup (including no eye makeup) or nail polish on your fingers or toes. 10. DO NOT wear any jewelry or piercings on day of surgery. All body piercing jewelry must be removed. 11. If you have ___dentures, they will be removed before going to the OR; we will provide you a container. If you wear ___contact lenses or ___glasses, they will be removed; please bring a case for them. 12. Please see your family doctor/pediatrician for a history & physical and/or concerning medications. Bring any test results/reports from your physician's office. PCP__________________Phone___________H&P Appt. Date________             13 If you  have a Living Will and Durable Power of  for Healthcare, please bring in a copy. 15. Notify your Surgeon if you develop any illness between now and surgery  time, cough, cold, fever, sore throat, nausea, vomiting, etc.  Please notify your surgeon if you experience dizziness, shortness of breath or blurred vision between now & the time of your surgery             15. DO NOT shave your operative site 96 hours prior to surgery. For face & neck surgery, men may use an electric razor 48 hours prior to surgery. 16. Shower the night before or morning of surgery using an antibacterial soap or as you have been instructed.              17. To provide excellent care visitors will be limited to one in the room at any given time. 18.  Please bring picture ID and insurance card. 19.  Visit our web site for additional information:  [email or web address]              20.During flu season no children under the age of 15 are permitted in the hospital for the safety of all patients. 21. If you take a long acting insulin in the evening only  take half of your usual  dose the night  before your procedure              22. If you use a c-pap please bring DOS if staying overnight,             23.For your convenience [facility] has a pharmacy on site to fill your prescriptions. 24. If you use oxygen and have a portable tank please bring it  with you the DOS             25. Bring a complete list of all your medications with name and dose include any supplements. 26. Other__________________________________________   *Please call pre admission testing if you any further questions   [facility] [address]    [phone]    __ Done-Where _____  __ Scheduled ___ Where ___   X_ Other _will test at outside facility and bring card_________  __ VACCINATED-instructed to bring card DOS      VISITOR POLICY(subject to change)    There is a one visitor policy at [facility] for all surgeries and endoscopies. Whether the visitor can stay or will be asked to wait in the car will depend on the current policy and if social distancing can be maintained. The policy is subject to change at any time. Please make sure the visitor has a cell phone that is on,charged and able to accept calls, as this may be the way that the staff communicates with them. Pain management is NO VISITOR policyThe patients ride is expected to remain in the car with a cell phone for communication. If the ride is leaving the hospital grounds please make sure they are back in time for pickup.  Have the patient inform the staff on arrival what their rides plans are while the patient is in the facility. At the MAIN there is one visitor allowed. Please note that the visitor policy is subject to change. All above information reviewed with patient's wife by phone. Wife verbalizes understanding. All questions and concerns addressed.                                                                                                  Patient/Rep____________________                                                                                                                                    PRE OP INSTRUCTIONS

## 2021-07-30 ENCOUNTER — ANESTHESIA EVENT (OUTPATIENT)
Dept: OPERATING ROOM | Age: 61
End: 2021-07-30
Payer: COMMERCIAL

## 2021-08-02 ENCOUNTER — HOSPITAL ENCOUNTER (OUTPATIENT)
Age: 61
Setting detail: OUTPATIENT SURGERY
Discharge: HOME | End: 2021-08-02
Attending: ORTHOPAEDIC SURGERY | Admitting: ORTHOPAEDIC SURGERY
Payer: COMMERCIAL

## 2021-08-02 ENCOUNTER — ANESTHESIA (OUTPATIENT)
Dept: OPERATING ROOM | Age: 61
End: 2021-08-02
Payer: COMMERCIAL

## 2021-08-02 VITALS
TEMPERATURE: 95.9 F | DIASTOLIC BLOOD PRESSURE: 68 MMHG | OXYGEN SATURATION: 99 % | RESPIRATION RATE: 9 BRPM | SYSTOLIC BLOOD PRESSURE: 107 MMHG

## 2021-08-02 VITALS
OXYGEN SATURATION: 96 % | WEIGHT: 220 LBS | BODY MASS INDEX: 29.8 KG/M2 | DIASTOLIC BLOOD PRESSURE: 80 MMHG | TEMPERATURE: 97.3 F | SYSTOLIC BLOOD PRESSURE: 148 MMHG | HEIGHT: 72 IN | RESPIRATION RATE: 16 BRPM | HEART RATE: 72 BPM

## 2021-08-02 DIAGNOSIS — S83.231D COMPLEX TEAR OF MEDIAL MENISCUS OF RIGHT KNEE AS CURRENT INJURY, SUBSEQUENT ENCOUNTER: Primary | ICD-10-CM

## 2021-08-02 PROCEDURE — C1713 ANCHOR/SCREW BN/BN,TIS/BN: HCPCS | Performed by: ORTHOPAEDIC SURGERY

## 2021-08-02 PROCEDURE — 2500000003 HC RX 250 WO HCPCS: Performed by: NURSE ANESTHETIST, CERTIFIED REGISTERED

## 2021-08-02 PROCEDURE — 7100000000 HC PACU RECOVERY - FIRST 15 MIN: Performed by: ORTHOPAEDIC SURGERY

## 2021-08-02 PROCEDURE — 2500000003 HC RX 250 WO HCPCS: Performed by: ORTHOPAEDIC SURGERY

## 2021-08-02 PROCEDURE — 3700000000 HC ANESTHESIA ATTENDED CARE: Performed by: ORTHOPAEDIC SURGERY

## 2021-08-02 PROCEDURE — 6360000002 HC RX W HCPCS: Performed by: NURSE ANESTHETIST, CERTIFIED REGISTERED

## 2021-08-02 PROCEDURE — 6360000002 HC RX W HCPCS

## 2021-08-02 PROCEDURE — 2580000003 HC RX 258: Performed by: ORTHOPAEDIC SURGERY

## 2021-08-02 PROCEDURE — 6360000002 HC RX W HCPCS: Performed by: ORTHOPAEDIC SURGERY

## 2021-08-02 PROCEDURE — 2709999900 HC NON-CHARGEABLE SUPPLY: Performed by: ORTHOPAEDIC SURGERY

## 2021-08-02 PROCEDURE — 7100000001 HC PACU RECOVERY - ADDTL 15 MIN: Performed by: ORTHOPAEDIC SURGERY

## 2021-08-02 PROCEDURE — 3600000014 HC SURGERY LEVEL 4 ADDTL 15MIN: Performed by: ORTHOPAEDIC SURGERY

## 2021-08-02 PROCEDURE — 2720000010 HC SURG SUPPLY STERILE: Performed by: ORTHOPAEDIC SURGERY

## 2021-08-02 PROCEDURE — 6370000000 HC RX 637 (ALT 250 FOR IP)

## 2021-08-02 PROCEDURE — 6360000002 HC RX W HCPCS: Performed by: FAMILY MEDICINE

## 2021-08-02 PROCEDURE — 3600000004 HC SURGERY LEVEL 4 BASE: Performed by: ORTHOPAEDIC SURGERY

## 2021-08-02 PROCEDURE — 3700000001 HC ADD 15 MINUTES (ANESTHESIA): Performed by: ORTHOPAEDIC SURGERY

## 2021-08-02 PROCEDURE — 7100000011 HC PHASE II RECOVERY - ADDTL 15 MIN: Performed by: ORTHOPAEDIC SURGERY

## 2021-08-02 PROCEDURE — 7100000010 HC PHASE II RECOVERY - FIRST 15 MIN: Performed by: ORTHOPAEDIC SURGERY

## 2021-08-02 DEVICE — ENDOBUTTON, 4.0 MM X 12 MM
Type: IMPLANTABLE DEVICE | Site: KNEE | Status: FUNCTIONAL
Brand: ENDOBUTTON

## 2021-08-02 RX ORDER — OXYCODONE HYDROCHLORIDE 5 MG/1
TABLET ORAL
Status: COMPLETED
Start: 2021-08-02 | End: 2021-08-02

## 2021-08-02 RX ORDER — OXYCODONE HYDROCHLORIDE 5 MG/1
5 TABLET ORAL EVERY 4 HOURS PRN
Status: DISCONTINUED | OUTPATIENT
Start: 2021-08-02 | End: 2021-08-02 | Stop reason: HOSPADM

## 2021-08-02 RX ORDER — PROPOFOL 10 MG/ML
INJECTION, EMULSION INTRAVENOUS PRN
Status: DISCONTINUED | OUTPATIENT
Start: 2021-08-02 | End: 2021-08-02 | Stop reason: SDUPTHER

## 2021-08-02 RX ORDER — HYDROMORPHONE HCL 110MG/55ML
PATIENT CONTROLLED ANALGESIA SYRINGE INTRAVENOUS
Status: COMPLETED
Start: 2021-08-02 | End: 2021-08-02

## 2021-08-02 RX ORDER — IBUPROFEN 800 MG/1
800 TABLET ORAL EVERY 6 HOURS PRN
Qty: 120 TABLET | Refills: 3 | Status: SHIPPED | OUTPATIENT
Start: 2021-08-02

## 2021-08-02 RX ORDER — OXYCODONE HYDROCHLORIDE 5 MG/1
5 TABLET ORAL EVERY 4 HOURS PRN
Qty: 30 TABLET | Refills: 0 | Status: SHIPPED | OUTPATIENT
Start: 2021-08-02 | End: 2021-08-09

## 2021-08-02 RX ORDER — LIDOCAINE HYDROCHLORIDE 20 MG/ML
INJECTION, SOLUTION EPIDURAL; INFILTRATION; INTRACAUDAL; PERINEURAL PRN
Status: DISCONTINUED | OUTPATIENT
Start: 2021-08-02 | End: 2021-08-02 | Stop reason: SDUPTHER

## 2021-08-02 RX ORDER — SODIUM CHLORIDE, SODIUM LACTATE, POTASSIUM CHLORIDE, CALCIUM CHLORIDE 600; 310; 30; 20 MG/100ML; MG/100ML; MG/100ML; MG/100ML
INJECTION, SOLUTION INTRAVENOUS CONTINUOUS
Status: DISCONTINUED | OUTPATIENT
Start: 2021-08-02 | End: 2021-08-02 | Stop reason: HOSPADM

## 2021-08-02 RX ORDER — ACETAMINOPHEN 500 MG
1000 TABLET ORAL EVERY 6 HOURS PRN
Qty: 60 TABLET | Refills: 3 | Status: SHIPPED | OUTPATIENT
Start: 2021-08-02

## 2021-08-02 RX ORDER — LIDOCAINE HYDROCHLORIDE 10 MG/ML
0.5 INJECTION, SOLUTION EPIDURAL; INFILTRATION; INTRACAUDAL; PERINEURAL ONCE
Status: DISCONTINUED | OUTPATIENT
Start: 2021-08-02 | End: 2021-08-02 | Stop reason: HOSPADM

## 2021-08-02 RX ORDER — FENTANYL CITRATE 50 UG/ML
INJECTION, SOLUTION INTRAMUSCULAR; INTRAVENOUS PRN
Status: DISCONTINUED | OUTPATIENT
Start: 2021-08-02 | End: 2021-08-02 | Stop reason: SDUPTHER

## 2021-08-02 RX ORDER — HYDROMORPHONE HCL 110MG/55ML
0.5 PATIENT CONTROLLED ANALGESIA SYRINGE INTRAVENOUS EVERY 5 MIN PRN
Status: DISCONTINUED | OUTPATIENT
Start: 2021-08-02 | End: 2021-08-02 | Stop reason: HOSPADM

## 2021-08-02 RX ORDER — KETAMINE HCL IN NACL, ISO-OSM 100MG/10ML
SYRINGE (ML) INJECTION PRN
Status: DISCONTINUED | OUTPATIENT
Start: 2021-08-02 | End: 2021-08-02 | Stop reason: SDUPTHER

## 2021-08-02 RX ORDER — ONDANSETRON 2 MG/ML
INJECTION INTRAMUSCULAR; INTRAVENOUS PRN
Status: DISCONTINUED | OUTPATIENT
Start: 2021-08-02 | End: 2021-08-02 | Stop reason: SDUPTHER

## 2021-08-02 RX ORDER — DEXAMETHASONE SODIUM PHOSPHATE 4 MG/ML
INJECTION, SOLUTION INTRA-ARTICULAR; INTRALESIONAL; INTRAMUSCULAR; INTRAVENOUS; SOFT TISSUE PRN
Status: DISCONTINUED | OUTPATIENT
Start: 2021-08-02 | End: 2021-08-02 | Stop reason: SDUPTHER

## 2021-08-02 RX ORDER — PHENYLEPHRINE HCL IN 0.9% NACL 1 MG/10 ML
SYRINGE (ML) INTRAVENOUS PRN
Status: DISCONTINUED | OUTPATIENT
Start: 2021-08-02 | End: 2021-08-02 | Stop reason: SDUPTHER

## 2021-08-02 RX ADMIN — HYDROMORPHONE HYDROCHLORIDE 0.5 MG: 2 INJECTION, SOLUTION INTRAMUSCULAR; INTRAVENOUS; SUBCUTANEOUS at 09:00

## 2021-08-02 RX ADMIN — PROPOFOL 200 MG: 10 INJECTION, EMULSION INTRAVENOUS at 07:16

## 2021-08-02 RX ADMIN — ONDANSETRON 4 MG: 2 INJECTION INTRAMUSCULAR; INTRAVENOUS at 07:08

## 2021-08-02 RX ADMIN — OXYCODONE HYDROCHLORIDE 5 MG: 5 TABLET ORAL at 09:28

## 2021-08-02 RX ADMIN — SODIUM CHLORIDE, POTASSIUM CHLORIDE, SODIUM LACTATE AND CALCIUM CHLORIDE: 600; 310; 30; 20 INJECTION, SOLUTION INTRAVENOUS at 06:35

## 2021-08-02 RX ADMIN — Medication 100 MCG: at 07:42

## 2021-08-02 RX ADMIN — FENTANYL CITRATE 25 MCG: 50 INJECTION, SOLUTION INTRAMUSCULAR; INTRAVENOUS at 07:29

## 2021-08-02 RX ADMIN — Medication 20 MG: at 07:29

## 2021-08-02 RX ADMIN — HYDROMORPHONE HYDROCHLORIDE 0.5 MG: 2 INJECTION, SOLUTION INTRAMUSCULAR; INTRAVENOUS; SUBCUTANEOUS at 09:10

## 2021-08-02 RX ADMIN — FENTANYL CITRATE 25 MCG: 50 INJECTION, SOLUTION INTRAMUSCULAR; INTRAVENOUS at 08:00

## 2021-08-02 RX ADMIN — Medication 100 MCG: at 08:09

## 2021-08-02 RX ADMIN — Medication 100 MCG: at 07:55

## 2021-08-02 RX ADMIN — CEFAZOLIN 2000 MG: 10 INJECTION, POWDER, FOR SOLUTION INTRAVENOUS at 06:56

## 2021-08-02 RX ADMIN — OXYCODONE 5 MG: 5 TABLET ORAL at 09:28

## 2021-08-02 RX ADMIN — LIDOCAINE HYDROCHLORIDE 100 MG: 20 INJECTION, SOLUTION EPIDURAL; INFILTRATION; INTRACAUDAL; PERINEURAL at 07:16

## 2021-08-02 RX ADMIN — DEXAMETHASONE SODIUM PHOSPHATE 8 MG: 4 INJECTION, SOLUTION INTRAMUSCULAR; INTRAVENOUS at 07:08

## 2021-08-02 ASSESSMENT — PULMONARY FUNCTION TESTS
PIF_VALUE: 5
PIF_VALUE: 2
PIF_VALUE: 3
PIF_VALUE: 4
PIF_VALUE: 3
PIF_VALUE: 5
PIF_VALUE: 6
PIF_VALUE: 5
PIF_VALUE: 6
PIF_VALUE: 3
PIF_VALUE: 5
PIF_VALUE: 6
PIF_VALUE: 5
PIF_VALUE: 5
PIF_VALUE: 1
PIF_VALUE: 6
PIF_VALUE: 5
PIF_VALUE: 6
PIF_VALUE: 5
PIF_VALUE: 6
PIF_VALUE: 4
PIF_VALUE: 5
PIF_VALUE: 2
PIF_VALUE: 1
PIF_VALUE: 3
PIF_VALUE: 5
PIF_VALUE: 3
PIF_VALUE: 4
PIF_VALUE: 3
PIF_VALUE: 6
PIF_VALUE: 3
PIF_VALUE: 6
PIF_VALUE: 5
PIF_VALUE: 0
PIF_VALUE: 5
PIF_VALUE: 6
PIF_VALUE: 5
PIF_VALUE: 5
PIF_VALUE: 4
PIF_VALUE: 4
PIF_VALUE: 3
PIF_VALUE: 4
PIF_VALUE: 3
PIF_VALUE: 3
PIF_VALUE: 5
PIF_VALUE: 4
PIF_VALUE: 9
PIF_VALUE: 5
PIF_VALUE: 2
PIF_VALUE: 1
PIF_VALUE: 5
PIF_VALUE: 7
PIF_VALUE: 6
PIF_VALUE: 5
PIF_VALUE: 2
PIF_VALUE: 0
PIF_VALUE: 2
PIF_VALUE: 5
PIF_VALUE: 5
PIF_VALUE: 4
PIF_VALUE: 5
PIF_VALUE: 6
PIF_VALUE: 6
PIF_VALUE: 5
PIF_VALUE: 3
PIF_VALUE: 5
PIF_VALUE: 5
PIF_VALUE: 3
PIF_VALUE: 1
PIF_VALUE: 4
PIF_VALUE: 3
PIF_VALUE: 2
PIF_VALUE: 27
PIF_VALUE: 2
PIF_VALUE: 10
PIF_VALUE: 5
PIF_VALUE: 6
PIF_VALUE: 5
PIF_VALUE: 5

## 2021-08-02 ASSESSMENT — PAIN DESCRIPTION - ORIENTATION
ORIENTATION: RIGHT

## 2021-08-02 ASSESSMENT — PAIN - FUNCTIONAL ASSESSMENT: PAIN_FUNCTIONAL_ASSESSMENT: 0-10

## 2021-08-02 ASSESSMENT — PAIN DESCRIPTION - FREQUENCY
FREQUENCY: CONTINUOUS

## 2021-08-02 ASSESSMENT — PAIN DESCRIPTION - PAIN TYPE
TYPE: SURGICAL PAIN
TYPE: ACUTE PAIN
TYPE: SURGICAL PAIN

## 2021-08-02 ASSESSMENT — PAIN SCALES - GENERAL
PAINLEVEL_OUTOF10: 6
PAINLEVEL_OUTOF10: 6
PAINLEVEL_OUTOF10: 4
PAINLEVEL_OUTOF10: 7

## 2021-08-02 ASSESSMENT — PAIN DESCRIPTION - DESCRIPTORS
DESCRIPTORS: ACHING;DISCOMFORT
DESCRIPTORS: ACHING;DISCOMFORT
DESCRIPTORS: ACHING

## 2021-08-02 ASSESSMENT — PAIN DESCRIPTION - LOCATION
LOCATION: KNEE

## 2021-08-02 ASSESSMENT — PAIN DESCRIPTION - ONSET
ONSET: PROGRESSIVE
ONSET: PROGRESSIVE

## 2021-08-02 NOTE — PROGRESS NOTES
Pt arrived to PACU from OR in Stable condition and is RASS -1 (Drowsy) . Respirations are Regular Pattern; RR 8-20 = 20 on 5L O2 per simple mask. Skin warm and dry. Abd is  soft. Pain: 0 at this time. Right knee surgical site(s) intact with dressing= clean, dry and intact. Brace locked at 0 degree. Pulses +3 DP and PT. Neurovascular checks WNL to RLE. Will continue to monitor for safety and comfort. S/P: RIGHT KNEE ARTHROSCOPY WITH MEDIAL MENISCUS ROOT REPAIR, with Dr. Hannah Zazueta at [facility].

## 2021-08-02 NOTE — OP NOTE
[facility]                     [address] [facility], [address] [facility]                                OPERATIVE REPORT    PATIENT NAME: Tor Long                      :        1960  MED REC NO:   [id number]                          ROOM:  ACCOUNT NO:   [de-identified]                           ADMIT DATE: 2021  PROVIDER:     Rachel Vegas MD    DATE OF PROCEDURE:  2021    PREOPERATIVE DIAGNOSIS:  Right knee medial meniscal root tear. POSTOPERATIVE DIAGNOSIS:  Right knee medial meniscal root tear. OPERATION PERFORMED:  Right knee arthroscopy with medial meniscal root  repair. PRIMARY SURGEON:  Rachel Vegas MD    FIRST ASSISTANT:  HARDY Tucker    ANESTHESIA:  General endotracheal.    COMPLICATIONS:  None. IMPLANTS:  Two Smith and Nephew MINITAPE sutures for medial meniscal  root repair as well as a single Smith and Nephew size 14 mm EndoButton  for medial meniscal root repair. BLOOD LOSS:  50 mL. CONDITION:  The patient postoperatively was stable. HISTORY:  The patient is a 26-year-old gentleman who presented to me  initially after an injury to his right knee where he was having  medial-sided knee pain. We obtained an MRI of his knee that did  demonstrate a full-thickness tear of the medial meniscal root with some  mild arthritis within the medial compartment. We had a long discussion  about treatment options including debridement of the meniscus versus  medial meniscal repair. After discussing risks and benefits of the  procedures, he elected to undergo medial meniscal root repair. OPERATIVE PROCEDURE:  The patient was seen in the preoperative holding  area. The right knee was confirmed to be the operative extremity and it  was marked at that period of time. He did receive 2 gm of Ancef  preoperatively. He was then brought back to the operating room in  supine position.   General endotracheal anesthesia was started per the  Anesthesia Service. He was positioned supine on the operating room  table with all bony prominences padded. Tourniquet was placed on the  patient's right thigh. Right lower extremity at that time was examined  under anesthesia and found to have full passive range of motion. The  right knee at that time was injected under sterile conditions through a  superior lateral approach with a total of 60 mL of local anesthetic  which was a 1:1 mixture of 0.5% Marcaine with epinephrine and 1%  lidocaine. Right knee at that time was prepped and draped in the  standard sterile fashion. A standard anterior inferior lateral portal was established at that time  by incising the skin with an #11 blade. Blunt trocar was then used to  insert a cannula into the knee joint. Spinal needle localization was  used to establish an anterior inferior medial portal.  #11 blade was  used to incise the skin anterior inferior medially. Nerve hook was  inserted in the joint and diagnostic arthroscopy was performed with the  following findings:  1. In the patellofemoral compartment, the articular surfaces of both  the patella and the trochlear groove were found to be intact. 2.  In the medial and lateral gutters, there was no evidence of loose  bodies. 3.  In the medial compartment, there was a full-thickness tear at the  root of the medial meniscus. There was grade II chondromalacia along  the medial femoral condyle. 4.  In the trochlear notch, the ACL and the PCL were found to be intact. 5. In the lateral compartment, the lateral meniscus was intact. There  was some grade II chondromalacia along the lateral tibial articular  surfaces. Medial compartment at that time was reentered. A shaver was used to  debride the end of the medial meniscus at the meniscal root until we did  have good tissue for repair.   A curette was then inserted through the  medial portal and we curetted the insertion site of the medial meniscus  at the root to create a good bony surface for repair. We then utilized  the guide for drilling of the medial meniscal root and drilled a  guidepin directly into the insertion of the medial meniscal root. Once  that was in place, we used the _____ [facility] suture passer to pass two  MINITAPE into the posterior horn of the medial meniscus. This did give  us an excellent bite onto the posterior horn of the medial meniscus. We  then shuttled those sutures through the hole that we drilled into the  tibia, which brought our sutures out along the anterior medial tibia. We noted that by pulling those sutures, we had excellent reduction of  the medial meniscus down to the meniscal root. We then created an  excision accessory along the anterior medial tibia for placement of our  EndoButton. We inserted our two sutures into the EndoButton. We  sequentially tied those sutures to give it excellent fixation of that  medial meniscal root. The knee was taken through range of motion. The  root repair was found to be stable. We then drained the knee. All  surgical instruments were removed from the knee. Portal closure was  done with 3-0 nylon sutures. Sterile dressings were placed over the  knee. The knee was placed into an Ace wrap. The patient at that time  was awakened from anesthesia and transferred to PACU in stable  condition.         Adam Clark MD    D: 08/02/2021 14:24:06       T: 08/02/2021 16:54:42     [email or web address]  Job#: [id number]     Doc#: [id number]    CC:

## 2021-08-02 NOTE — H&P
Date of Surgery Update:  Eric Boudreaux was seen, history and physical examination reviewed, and patient examined by me today. There have been no significant clinical changes since the completion of the previous history and physical.    The risk, benefits, and alternatives of the proposed procedure have been explained to the patient (or appropriate guardian) and understanding verbalized. All questions answered. Patient wishes to proceed.     Electronically signed by: Beatrice Rocha MD,8/2/2021,6:56 AM

## 2021-08-02 NOTE — PROGRESS NOTES
Oxycodone 5mg po for pain rated 6/10. Ice to right knee brace and dressing in place. Neurovascular checks WNL to RLE. Pt reports he has crutches in the car. VSS. Tolerating po well. VSS. PIV d/c's at this time. Discharge instructions reviewed with pt and wife.

## 2021-08-02 NOTE — PROGRESS NOTES
Pt alert. C/o pain in right knee. Dilaudid 0.5 mg IV x1. Pt has prescriptions for motrin, tylenol ES ,and oxycodone 5mg. Neurovascular checks WNL to RLE. Pulses 3+. Ice to right knee. Dressing and brace in place. Tranferred to phase 2 level of care.

## 2021-08-02 NOTE — ANESTHESIA POSTPROCEDURE EVALUATION
Department of Anesthesiology  Postprocedure Note    Patient: Sole Moreira  MRN: [id number]  YOB: 1960  Date of evaluation: 8/2/2021  Time:  9:53 AM     Procedure Summary     Date: 08/02/21 Room / Location: [address]    Anesthesia Start: 0700 Anesthesia Stop: 1870    Procedure: RIGHT KNEE ARTHROSCOPY WITH MEDIAL MENISCUS ROOT REPAIR (Right Knee) Diagnosis: (B69.446F  MEDIAL MENISCUS TEAR)    Surgeons: Leilani Cortez MD Responsible Provider: Colin Hampton MD    Anesthesia Type: general ASA Status: 3          Anesthesia Type: general    Sharifa Phase I: Sharifa Score: 10    Sharifa Phase II: Sharifa Score: 10    Last vitals: Reviewed and per EMR flowsheets.        Anesthesia Post Evaluation    Level of consciousness: awake  Complications: no

## 2021-08-02 NOTE — ANESTHESIA PRE PROCEDURE
Department of Anesthesiology  Preprocedure Note       Name:  Ricki Hassan   Age:  56 y.o.  :  1960                                          MRN:  [id number]         Date:  2021      Surgeon: Umberto Moses):  Rachel Vegas MD    Procedure: Procedure(s):  RIGHT KNEE ARTHROSCOPY WITH MEDIAL MENISCUS ROOT REPAIR    Medications prior to admission:   Prior to Admission medications    Medication Sig Start Date End Date Taking? Authorizing Provider   lisinopril (PRINIVIL;ZESTRIL) 20 MG tablet TAKE 1 TABLET BY MOUTH EVERY DAY 21  Yes Mark Mai MD   metoprolol tartrate (LOPRESSOR) 25 MG tablet TAKE 1/2 TABLET BY MOUTH TWICE A DAY**NEED LAB WORK** 21  Yes Mark Mai MD   rosuvastatin (CRESTOR) 40 MG tablet Take 1 tablet by mouth daily  Patient taking differently: Take 20 mg by mouth daily  4/15/21  Yes Jeff St, APRN - CNP   Misc Natural Products (FIBER 7 PO) Take 2 tablets by mouth daily. Yes Historical Provider, MD   Acetaminophen (TYLENOL PO) Take by mouth    Historical Provider, MD   nitroGLYCERIN (NITROSTAT) 0.4 MG SL tablet up to max of 3 total doses. If no relief after 1 dose, call 911. 3/3/18   Stewart Chua MD   Famotidine (PEPCID AC PO) Take by mouth Daily     Historical Provider, MD   Multiple Vitamins-Minerals (MULTIVITAMIN ADULT PO) Take by mouth Daily     Historical Provider, MD   aspirin 81 MG EC tablet Take 81 mg by mouth daily.       Historical Provider, MD       Current medications:    Current Facility-Administered Medications   Medication Dose Route Frequency Provider Last Rate Last Admin    lactated ringers infusion   Intravenous Continuous Rachel Vegas MD        lidocaine PF 1 % injection 0.5 mL  0.5 mL Intradermal Once Rachel Vegas MD        ceFAZolin (ANCEF) 2000 mg in dextrose 5 % 50 mL IVPB  2,000 mg Intravenous [facility] to [facility]           Allergies:  No Known Allergies    Problem List:    Patient Active Problem List   Diagnosis Code    Hyperlipidemia E78.5    Tobacco abuse Z72.0    Abnormal stress ECG R94.39    NSTEMI (non-ST elevated myocardial infarction) ([facility] [address] I21.4    Essential hypertension I10    Coronary artery disease involving native coronary artery of native heart without angina pectoris I25.10    Hypercholesteremia E78.00    Chest pain R07.9    Unstable angina ([facility] [address] I20.0    Coronary artery disease due to lipid rich plaque I25.10, I25.83       Past Medical History:        Diagnosis Date    Hypertension     Hyperthyroidism     MI, old 2016    NSTEMI       Past Surgical History:        Procedure Laterality Date    APPENDECTOMY      CARDIAC CATHETERIZATION      DIAGNOSTIC CARDIAC CATH LAB PROCEDURE  2016    1 stent placed    [address] [facility]      for dislocated shoulder       Social History:    Social History     Tobacco Use    Smoking status: Former Smoker     Packs/day: 1.00     Years: 25.00     Pack years: 25.00     Types: Cigarettes     Quit date: 10/31/2011     Years since quittin.7    Smokeless tobacco: Never Used   Substance Use Topics    Alcohol use: Yes     Comment: occasionally 1-2 drinks/month                                Counseling given: Not Answered      Vital Signs (Current):   Vitals:    21 1027 21 0619   BP:  131/83   Pulse:  62   Resp:  16   Temp:  97.7 °F (36.5 °C)   TempSrc:  Temporal   SpO2:  97%   Weight: 230 lb (104.3 kg) 220 lb (99.8 kg)   Height: 6' (1.829 m) 6' (1.829 m)                                              BP Readings from Last 3 Encounters:   21 131/83   21 130/78   21 136/78       NPO Status: Time of last liquid consumption: 0000                        Time of last solid consumption: 0000                        Date of last liquid consumption: 21                        Date of last solid food consumption: 21    BMI:   Wt Readings from Last 3 Encounters:   21 220 lb (99.8 kg)   21 222 lb 6.4 oz (100.9 kg)   04/07/21 220 lb (99.8 kg)     Body mass index is 29.84 kg/m². CBC:   Lab Results   Component Value Date    WBC 6.8 03/03/2018    RBC 4.51 03/03/2018    HGB 14.9 03/03/2018    HCT 44.1 03/03/2018    MCV 97.8 03/03/2018    RDW 14.0 03/03/2018     03/03/2018       CMP:   Lab Results   Component Value Date     12/29/2018    K 3.9 12/29/2018     12/29/2018    CO2 29 12/29/2018    BUN 13 12/29/2018    CREATININE 1.01 12/29/2018    GFRAA 91 12/29/2018    LABGLOM 79 12/29/2018    GLUCOSE 114 12/29/2018    CALCIUM 9.2 12/29/2018    AST 20 09/19/2020    ALT 25 09/19/2020       POC Tests: No results for input(s): POCGLU, POCNA, POCK, POCCL, POCBUN, POCHEMO, POCHCT in the last 72 hours. Coags: No results found for: PROTIME, INR, APTT    HCG (If Applicable): No results found for: PREGTESTUR, PREGSERUM, HCG, HCGQUANT     ABGs: No results found for: PHART, PO2ART, YFL9EBO, YLB0TCU, BEART, N3XJGCYE     Type & Screen (If Applicable):  No results found for: LABABO, LABRH    Drug/Infectious Status (If Applicable):  No results found for: HIV, HEPCAB    COVID-19 Screening (If Applicable): No results found for: COVID19        Anesthesia Evaluation    Airway: Mallampati: II  TM distance: >3 FB   Neck ROM: full  Mouth opening: > = 3 FB Dental:          Pulmonary:                              Cardiovascular:    (+) hypertension:, angina:, past MI:, CAD:,         Rhythm: regular  Rate: normal                    Neuro/Psych:               GI/Hepatic/Renal:             Endo/Other:    (+) hyperthyroidism::., .                 Abdominal:             Vascular: Other Findings:             Anesthesia Plan      general     ASA 3       Induction: intravenous. Anesthetic plan and risks discussed with patient. Plan discussed with CRNA.                   Miranda Roa MD   8/2/2021

## 2021-08-11 ENCOUNTER — HOSPITAL ENCOUNTER (OUTPATIENT)
Dept: PHYSICAL THERAPY | Age: 61
Setting detail: THERAPIES SERIES
Discharge: HOME | End: 2021-08-11
Payer: COMMERCIAL

## 2021-08-11 PROCEDURE — 97140 MANUAL THERAPY 1/> REGIONS: CPT

## 2021-08-11 PROCEDURE — 97161 PT EVAL LOW COMPLEX 20 MIN: CPT

## 2021-08-11 PROCEDURE — 97110 THERAPEUTIC EXERCISES: CPT

## 2021-08-11 NOTE — PLAN OF CARE
[place] Maur[place]  Phone: [phone]   Fax:     [phone]                                                       Physical Therapy Certification    Dear Referring Practitioner: Dr. Argenis Joy,    We had the pleasure of evaluating the following patient for physical therapy services at [address] [place] [address]. A summary of our findings can be found in the initial assessment below. This includes our plan of care. If you have any questions or concerns regarding these findings, please do not hesitate to contact me at the office phone number checked above. Thank you for the referral.       Physician Signature:_______________________________Date:__________________  By signing above (or electronic signature), therapists plan is approved by physician      Patient: Barbara Nguyen   : 1960   MRN: [id number]  Referring Physician: Referring Practitioner: Dr. Argenis Joy      Evaluation Date: 2021      Medical Diagnosis Information:  Diagnosis: G73.953C comlex tear of medial meniscus, right knee   Treatment Diagnosis: M25.561                                         Insurance information: PT Insurance Information: [place]; 20 visits/year hard max; auth needed thru [employer]     Precautions/ Contra-indications: heart stent ; s/p R meniscal root repair DOS: 2021  Latex Allergy:  [x]NO      []YES  Preferred Language for Healthcare:   [x]English       []other:    C-SSRS Triggered by Intake questionnaire (Past 2 wk assessment ):   [x] No, Questionnaire did not trigger screening.   [] Yes, Patient intake triggered C-SSRS Screening      [] C-SSRS Screening completed  [] PCP notified via Epic     SUBJECTIVE: Patient stated complaint: Pt presents for post-op evaluation of R meniscal repair on 2021.  Pt states he has had knee problems for a while but finally had some increased pain after running with his granddaughter. States knee feels pretty good, only complains of some tightness in the back and under his knee. States he understands NWB restrictions for 6 weeks until he sees Dr. Edy Man again. Denies N/T. Has not been doing anything for HEP. Relevant Medical History:HTN, heart stent in 2015  Functional Scale/Score: LEFS: 23/80 = 71% dysfunction     Pain Scale: 2-5/10  Easing factors: rest, ice  Provocative factors: bending     Type: []Constant   [x]Intermittent  []Radiating [x]Localized []other:     Numbness/Tingling: pt denied    Occupation/School:     Living Status/Prior Level of Function: Independent with ADLs and IADLs    OBJECTIVE:     ROM LEFT RIGHT   HIP Flex     HIP Abd     HIP Ext     HIP IR     HIP ER     Knee ext 0 0   Knee Flex 145 A: 82  P: 90   Ankle PF WFL WFL   Ankle DF WFL WFL   Ankle In     Ankle Ev     Strength  LEFT RIGHT   HIP Flexors     HIP Abductors 4 4   HIP Ext     Hip ER     Knee EXT (quad) 5 NT   Knee Flex (HS) 5 NT   Ankle DF 5 5   Ankle PF 5 5   Ankle Inv 5 5   Ankle EV 5 5        Circumference  Mid apex  7 cm prox             Reflexes/Sensation:    [x]Dermatomes/Myotomes intact    [x]Reflexes equal and normal bilaterally   []Other:    Joint mobility: R tibfem    []Normal    [x]Hypo   []Hyper    Palpation: TTP medial joint line/adductors    Functional Mobility/Transfers: n/a    Posture: WFL    Bandages/Dressings/Incisions: steri-strips in place, no evidence of drainage/infection    Gait: (include devices/WB status) NWB RLE, bilateral axillary crutches; R knee immobilizer brace lockedi n extension    Orthopedic Special Tests: n/a                       [x] Patient history, allergies, meds reviewed. Medical chart reviewed. See intake form. Review Of Systems (ROS):  [x]Performed Review of systems (Integumentary, CardioPulmonary, Neurological) by intake and observation.  Intake form has been scanned into medical record. Patient has been instructed to contact their primary care physician regarding ROS issues if not already being addressed at this time. Co-morbidities/Complexities (which will affect course of rehabilitation):   []None           Arthritic conditions   []Rheumatoid arthritis (M05.9)  []Osteoarthritis (M19.91)   Cardiovascular conditions   [x]Hypertension (I10)  []Hyperlipidemia (E78.5)  []Angina pectoris (I20)  [x]Atherosclerosis (I70)  []CVA Musculoskeletal conditions   []Disc pathology   []Congenital spine pathologies   []Prior surgical intervention  []Osteoporosis (M81.8)  []Osteopenia (M85.8)   Endocrine conditions   []Hypothyroid (E03.9)  []Hyperthyroid Gastrointestinal conditions   []Constipation (C72.45)   Metabolic conditions   []Morbid obesity (E66.01)  []Diabetes type 1(E10.65) or 2 (E11.65)   []Neuropathy (G60.9)     Pulmonary conditions   []Asthma (J45)  []Coughing   []COPD (J44.9)   Psychological Disorders  []Anxiety (F41.9)  []Depression (F32.9)   []Other:   []Other:          Barriers to/and or personal factors that will affect rehab potential:              []Age  []Sex    []Smoker              []Motivation/Lack of Motivation                        []Co-Morbidities              []Cognitive Function, education/learning barriers              []Environmental, home barriers              []profession/work barriers  []past PT/medical experience  []other:  Justification:     Falls Risk Assessment (30 days):   [x] Falls Risk assessed and no intervention required. [] Falls Risk assessed and Patient requires intervention due to being higher risk   TUG score (>12s at risk):     [] Falls education provided, including         ASSESSMENT: Pt presents for evaluation 1 weeks s/p R meniscal root repair. Upon assessment, pt presents w/ initial limitation in active knee flexion w/ limit to 90 deg x 6 weeks, decreased proximal hip strength, and mild increased swelling.  Pt w/ excellent quad recruitment, able to perform SLR without TKE lag, however has quick fatigue and tightness reported medial knee. Given pt insurance restrictions, 1-2x/week frequency per necessity w/ ROM and strength limitations. Pt will benefit from skilled PT to improve functional ROM, mobility and strength for safe return to normal daily and work activities. Functional Impairments:     [x]Noted lumbar/proximal hip/LE hypomobility   [x]Decreased LE functional ROM   [x]Decreased core/proximal hip strength and neuromuscular control   [x]Decreased LE functional strength   [x]Reduced balance/proprioceptive control   []other:      Functional Activity Limitations (from functional questionnaire and intake)   [x]Reduced ability to tolerate prolonged functional positions   []Reduced ability or difficulty with changes of positions or transfers between positions   []Reduced ability to maintain good posture and demonstrate good body mechanics with sitting, bending, and lifting   []Reduced ability to sleep   [] Reduced ability or tolerance with driving and/or computer work   [x]Reduced ability to perform lifting, carrying tasks   [x]Reduced ability to squat   [x]Reduced ability to forward bend   [x]Reduced ability to ambulate prolonged functional periods/distances/surfaces   [x]Reduced ability to ascend/descend stairs   [x]Reduced ability to run, hop or jump   []other:     Participation Restrictions   [x]Reduced participation in self care activities   []Reduced participation in home management activities   [x]Reduced participation in work activities   []Reduced participation in social activities. [x]Reduced participation in sport activities. Classification :    [x]Signs/symptoms consistent with post-surgical status including decreased ROM, strength and function.    []Signs/symptoms consistent with joint sprain/strain   []Signs/symptoms consistent with patella-femoral syndrome   []Signs/symptoms consistent with knee OA/hip OA   []Signs/symptoms consistent with internal derangement of knee/Hip   []Signs/symptoms consistent with functional hip weakness/NMR control      []Signs/symptoms consistent with tendinitis/tendinosis    []signs/symptoms consistent with pathology which may benefit from Dry needling      []other:      Prognosis/Rehab Potential:      [x]Excellent   []Good    []Fair   []Poor    Tolerance of evaluation/treatment:    [x]Excellent   []Good    []Fair   []Poor    Physical Therapy Evaluation Complexity Justification  [x] A history of present problem with:  [] no personal factors and/or comorbidities that impact the plan of care;  [x]1-2 personal factors and/or comorbidities that impact the plan of care  []3 personal factors and/or comorbidities that impact the plan of care  [x] An examination of body systems using standardized tests and measures addressing any of the following: body structures and functions (impairments), activity limitations, and/or participation restrictions;:  [x] a total of 1-2 or more elements   [] a total of 3 or more elements   [] a total of 4 or more elements   [x] A clinical presentation with:  [x] stable and/or uncomplicated characteristics   [] evolving clinical presentation with changing characteristics  [] unstable and unpredictable characteristics;   [x] Clinical decision making of [x] low, [] moderate, [] high complexity using standardized patient assessment instrument and/or measurable assessment of functional outcome.     [x] EVAL (LOW) 41331 (typically 20 minutes face-to-face)  [] EVAL (MOD) 49263 (typically 30 minutes face-to-face)  [] EVAL (HIGH) 56413 (typically 45 minutes face-to-face)  [] RE-EVAL     PLAN:  Frequency/Duration:  2 days per week for 8 Weeks:  Interventions:  [x]  Therapeutic exercise including: strength training, ROM, for Lower extremity and core   [x]  NMR activation and proprioception for LE, Glutes and Core   [x]  Manual therapy as indicated for LE, Hip and spine to include: Dry Needling/IASTM, STM, PROM, Gr I-IV mobilizations, manipulation. [x] Modalities as needed that may include: thermal agents, E-stim, Biofeedback, US, iontophoresis as indicated  [x] Patient education on joint protection, postural re-education, activity modification, progression of HEP. HEP instruction: (see scanned forms)    GOALS:  Patient stated goal: \"get back to work and be able to squat down\"  [] Progressing: [] Met: [x] Not Met: [] Adjusted    Therapist goals for Patient:   Short Term Goals: To be achieved in: 2 weeks  1. Independent in HEP and progression per patient tolerance, in order to prevent re-injury. [] Progressing: [] Met: [x] Not Met: [] Adjusted  2. Patient will have a decrease in pain to facilitate improvement in movement, function, and ADLs as indicated by Functional Deficits. [] Progressing: [] Met: [x] Not Met: [] Adjusted    Long Term Goals: To be achieved in: 8 weeks  1. Disability index score of 10% or less for the LEFS to assist with reaching prior level of function. [] Progressing: [] Met: [x] Not Met: [] Adjusted  2. Patient will demonstrate increased AROM to full R knee to allow for proper joint functioning as indicated by patients Functional Deficits. [] Progressing: [] Met: [x] Not Met: [] Adjusted  3. Patient will demonstrate an increase in Strength to good proximal hip strength and control, within 5lb HHD in LE to allow for proper functional mobility as indicated by patients Functional Deficits. [] Progressing: [] Met: [x] Not Met: [] Adjusted  4. Patient will return to daily functional activities without increased symptoms or restriction. [] Progressing: [] Met: [x] Not Met: [] Adjusted  5. Pt will be able to return to work as a  without any increased symptoms or restrictions.    [] Progressing: [] Met: [x] Not Met: [] Adjusted     Electronically signed by:  Cristel Gonsalves, PT

## 2021-08-11 NOTE — FLOWSHEET NOTE
Melissa  [address] [facility], [place] [address]  Phone: [phone] Fax: [phone]    Physical Therapy Treatment Note/ Progress Report:     Date:  2021    Patient Name:  Tracey Rodriguez    :  1960  MRN: [id number]  Restrictions/Precautions:    Medical/Treatment Diagnosis Information:  · Diagnosis: S80.80A comlex tear of medial meniscus, right knee  · Treatment Diagnosis: P44.993  Insurance/Certification information:  PT Insurance Information: [place]; 20 visits/year hard max; auth needed thru [employer]IM  Physician Information:  Referring Practitioner: Dr. Dalila Brito of care signed (Y/N):     Date of Patient follow up with Physician:      Progress Report: []  Yes  [x]  No     Date Range for reporting period:  Beginnin2021  Ending:      Progress report due (10 Rx/or 30 days whichever is less):      Recertification due (POC duration/ or 90 days whichever is less): 10/11/2021     Visit # Insurance Allowable Auth Needed   1 20 hard max; auth thru [employer]IM [x]Yes   []No     Latex Allergy:  [x]NO      []YES  Preferred Language for Healthcare:   [x]English       []other:  Functional Scale: LEFS:   Date assessed:2021    Pain level:  2-5/10     SUBJECTIVE:  See eval    OBJECTIVE: See eval   Observation:    Test measurements:      RESTRICTIONS/PRECAUTIONS: s/p R meniscal root repair DOS: 2021; NWB x6 weeks; no flexion >90 deg x 6 weeks    Exercises/Interventions:     Therapeutic Ex (92081)   Min: 15 Sets/sec Reps Notes/CUES   Retro Stepper/BIKE      Alter G      BFR      Sportcord March      3 way SLR 2 10    SAQ      Clam ABD      Hip Ext /table 2 10    BOSU fwd/side lunge      BOSU squat      Leg Press Iso/Con/Ecc 0-      Cybex HS curl      TKE      Glute side walks      RDL      Slide Lunge      Slide HS eccentrics      Step ups/ecc step down      Swissball wall rolls- in SLS- hip drive      Quad hip ext/wall-ball rolls      Quad set 10 10    Seated hamstring stretch 20 3    Calf strap stretch 20 3          Manual Intervention (49550)  Min: 15      Knee mobs/PROM 5     Tib/Fem Mobs      Patella Mobs 5     Ankle mobs      STM 5  Posterior knee, calf         NMR re-education (92279)  Min:   CUES NEEDED   Egyptian/Biofeedback 10/10      BFR      G. Med activation      Hip Ext full ROM/ G. Activation      Bosu Bal and Prop- G Med      Single leg stance/Balance/Prop      Bosu Retro G. Med act      Prone Hip froggers- sliders/elevated            Therapeutic Activity (65998)  Min:      Ladders      Plyos      Dynamic Balance                            Therapeutic Exercise and NMR EXR  [x] (35274) Provided verbal/tactile cueing for activities related to strengthening, flexibility, endurance, ROM for improvements in LE, proximal hip, and core control with self care, mobility, lifting, ambulation. [x] (23852) Provided verbal/tactile cueing for activities related to improving balance, coordination, kinesthetic sense, posture, motor skill, proprioception  to assist with LE, proximal hip, and core control in self care, mobility, lifting, ambulation and eccentric single leg control.      NMR and Therapeutic Activities:    [x] (33978 or 01398) Provided verbal/tactile cueing for activities related to improving balance, coordination, kinesthetic sense, posture, motor skill, proprioception and motor activation to allow for proper function of core, proximal hip and LE with self care and ADLs and functional mobility.   [] (79259) Gait Re-education- Provided training and instruction to the patient for proper LE, core and proximal hip recruitment and positioning and eccentric body weight control with ambulation re-education including up and down stairs     Home Exercise Program:    [x] (62567) Reviewed/Progressed HEP activities related to strengthening, flexibility, endurance, ROM of core, proximal hip and LE for functional self-care, 8 weeks  1. Disability index score of 10% or less for the LEFS to assist with reaching prior level of function. [] Progressing: [] Met: [x] Not Met: [] Adjusted  2. Patient will demonstrate increased AROM to full R knee to allow for proper joint functioning as indicated by patients Functional Deficits. [] Progressing: [] Met: [x] Not Met: [] Adjusted  3. Patient will demonstrate an increase in Strength to good proximal hip strength and control, within 5lb HHD in LE to allow for proper functional mobility as indicated by patients Functional Deficits. [] Progressing: [] Met: [x] Not Met: [] Adjusted  4. Patient will return to daily functional activities without increased symptoms or restriction. [] Progressing: [] Met: [x] Not Met: [] Adjusted  5. Pt will be able to return to work as a  without any increased symptoms or restrictions. [] Progressing: [] Met: [x] Not Met: [] Adjusted    ASSESSMENT:  See eval    Return to Play: (if applicable)   [x]  Stage 1: Intro to Strength   []  Stage 2: Return to Run and Strength   []  Stage 3: Return to Jump and Strength   []  Stage 4: Dynamic Strength and Agility   []  Stage 5: Sport Specific Training     []  Ready to Return to Play, Meets All Above Stages   []  Not Ready for Return to Sports   Comments:            Treatment/Activity Tolerance:  [x] Patient tolerated treatment well [] Patient limited by fatique  [] Patient limited by pain  [] Patient limited by other medical complications  [] Other:     Overall Progression Towards Functional goals/ Treatment Progress Update:  [] Patient is progressing as expected towards functional goals listed. [] Progression is slowed due to complexities/Impairments listed. [] Progression has been slowed due to co-morbidities.   [x] Plan just implemented, too soon to assess goals progression <30days   [] Goals require adjustment due to lack of progress  [] Patient is not progressing as expected and requires additional follow up with physician  [] Other    Prognosis for POC: [x] Good [] Fair  [] Poor    Patient requires continued skilled intervention: [x] Yes  [] No        PLAN: See eval  [] Continue per plan of care [] Alter current plan (see comments)  [x] Plan of care initiated [] Hold pending MD visit [] Discharge    Electronically signed by: Alyssia Carr PT    Note: If patient does not return for scheduled/recommended follow up visits, this note will serve as a discharge from care along with the most recent update on progress.

## 2021-08-17 ENCOUNTER — HOSPITAL ENCOUNTER (OUTPATIENT)
Dept: PHYSICAL THERAPY | Age: 61
Setting detail: THERAPIES SERIES
Discharge: HOME | End: 2021-08-17
Payer: COMMERCIAL

## 2021-08-17 PROCEDURE — 97110 THERAPEUTIC EXERCISES: CPT

## 2021-08-17 NOTE — FLOWSHEET NOTE
[facility] [address] [facility]  Phone: [phone] Fax: [phone]    Physical Therapy Treatment Note/ Progress Report:     Date:  2021    Patient Name:  Julia Chávez    :  1960  MRN: [id number]  Restrictions/Precautions:    Medical/Treatment Diagnosis Information:  · Diagnosis: S80.80A comlex tear of medial meniscus, right knee  · Treatment Diagnosis: M93.286  Insurance/Certification information:  PT Insurance Information: [place]; 20 visits/year hard max; auth needed thru AIM  Physician Information:  Referring Practitioner: Dr. Ray Laser of care signed (Y/N):     Date of Patient follow up with Physician:      Progress Report: []  Yes  [x]  No     Date Range for reporting period:  Beginnin2021  Ending:      Progress report due (10 Rx/or 30 days whichever is less):      Recertification due (POC duration/ or 90 days whichever is less): 10/11/2021     Visit # Insurance Allowable Auth Needed   2 20 hard max; auth thru AIM- denied; paying cash day of [x]Yes   []No     Latex Allergy:  [x]NO      []YES  Preferred Language for Healthcare:   [x]English       []other:  Functional Scale: LEFS:   Date assessed:2021    Pain level:  2-5/10     SUBJECTIVE:  States knee is doing well, a little stiff from staying in brace. Given lack of insurance coverage, agreeable to 1x/week. Reviewed postop restrictions as patient mentioned wanting to bend and drive.      OBJECTIVE: See eval   Observation:    Test measurements:      RESTRICTIONS/PRECAUTIONS: s/p R meniscal root repair DOS: 2021; NWB x6 weeks; no flexion >90 deg x 6 weeks    Exercises/Interventions:     Therapeutic Ex ()   Min: 15 Sets/sec Reps Notes/CUES   Retro Stepper/BIKE      Alter G      BFR      Sportcord March      3 way SLR 2 10    SAQ      Clam ABD 2 10    Hip Ext /table 2 10    BOSU fwd/side lunge      BOSU squat for Return to Sports   Comments:            Treatment/Activity Tolerance:  [x] Patient tolerated treatment well [] Patient limited by fatique  [] Patient limited by pain  [] Patient limited by other medical complications  [] Other:     Overall Progression Towards Functional goals/ Treatment Progress Update:  [] Patient is progressing as expected towards functional goals listed. [] Progression is slowed due to complexities/Impairments listed. [] Progression has been slowed due to co-morbidities. [x] Plan just implemented, too soon to assess goals progression <30days   [] Goals require adjustment due to lack of progress  [] Patient is not progressing as expected and requires additional follow up with physician  [] Other    Prognosis for POC: [x] Good [] Fair  [] Poor    Patient requires continued skilled intervention: [x] Yes  [] No        PLAN: See eval  [] Continue per plan of care [] Alter current plan (see comments)  [x] Plan of care initiated [] Hold pending MD visit [] Discharge    Electronically signed by: Ruth Flowers PT    Note: If patient does not return for scheduled/recommended follow up visits, this note will serve as a discharge from care along with the most recent update on progress.

## 2021-08-19 ENCOUNTER — APPOINTMENT (OUTPATIENT)
Dept: PHYSICAL THERAPY | Age: 61
End: 2021-08-19
Payer: COMMERCIAL

## 2021-08-24 ENCOUNTER — HOSPITAL ENCOUNTER (OUTPATIENT)
Dept: PHYSICAL THERAPY | Age: 61
Setting detail: THERAPIES SERIES
Discharge: HOME | End: 2021-08-24
Payer: COMMERCIAL

## 2021-08-24 PROCEDURE — 97140 MANUAL THERAPY 1/> REGIONS: CPT

## 2021-08-24 NOTE — FLOWSHEET NOTE
[place] [address] [facility] [address]  Phone: [phone] Fax: [phone]    Physical Therapy Treatment Note/ Progress Report:     Date:  2021    Patient Name:  Vicky Lopez    :  1960  MRN: [id number]  Restrictions/Precautions:    Medical/Treatment Diagnosis Information:  · Diagnosis: S80.80A comlex tear of medial meniscus, right knee  · Treatment Diagnosis: Y32.805  Insurance/Certification information:  PT Insurance Information: [employer]; 20 visits/year hard max; auth needed thru [employer]  Physician Information:  Referring Practitioner: Dr. Lashell Cabrera of care signed (Y/N):     Date of Patient follow up with Physician:      Progress Report: []  Yes  [x]  No     Date Range for reporting period:  Beginnin2021  Ending:      Progress report due (10 Rx/or 30 days whichever is less):      Recertification due (POC duration/ or 90 days whichever is less): 10/11/2021     Visit # Insurance Allowable Auth Needed   3 20 hard max; auth thru [employer]- denied; paying cash day of [x]Yes   []No     Latex Allergy:  [x]NO      []YES  Preferred Language for Healthcare:   [x]English       []other:  Functional Scale: LEFS:   Date assessed:2021    Pain level:  2-5/10     SUBJECTIVE:  States knee is doing well, still having some stiffness from brace and occasional ache but overall feeling good. States swelling has been much more controlled and tolerated this past week.      OBJECTIVE: See eval   Observation:    Test measurements:      RESTRICTIONS/PRECAUTIONS: s/p R meniscal root repair DOS: 2021; NWB x6 weeks; no flexion >90 deg x 6 weeks    Exercises/Interventions:     Therapeutic Ex (82103)   Min: 15 Sets/sec Reps Notes/CUES   Retro Stepper/BIKE      Alter G      BFR      Sportcord March      3 way SLR 2 10 HEP   SAQ      Clam ABD 2 10 HEP   Hip Ext /table 2 10 HEP   BOSU fwd/side lunge      BOSU squat      Leg Press Iso/Con/Ecc 0-      Cybex HS curl      TKE      Glute side walks      RDL      Slide Lunge      Slide HS eccentrics      Step ups/ecc step down      Swissball wall rolls- in SLS- hip drive      Quad hip ext/wall-ball rolls      Quad set      Seated hamstring stretch 20 3    Calf strap stretch 20 3          Manual Intervention (83603)  Min: 20      Knee mobs/PROM 8  To 90 deg flex   Tib/Fem Mobs      Patella Mobs 5  Grade 2-3; increased stiffness today   Ankle mobs      STM 8  Quad/quad tendon; IT band         NMR re-education (36359)  Min:   CUES NEEDED   Niuean/Biofeedback 10/10      BFR      G. Med activation      Hip Ext full ROM/ G. Activation      Bosu Bal and Prop- G Med      Single leg stance/Balance/Prop      Bosu Retro G. Med act      Prone Hip froggers- sliders/elevated            Therapeutic Activity (53129)  Min:      Ladders      Plyos      Dynamic Balance                            Therapeutic Exercise and NMR EXR  [x] (43152) Provided verbal/tactile cueing for activities related to strengthening, flexibility, endurance, ROM for improvements in LE, proximal hip, and core control with self care, mobility, lifting, ambulation. [x] (03258) Provided verbal/tactile cueing for activities related to improving balance, coordination, kinesthetic sense, posture, motor skill, proprioception  to assist with LE, proximal hip, and core control in self care, mobility, lifting, ambulation and eccentric single leg control.      NMR and Therapeutic Activities:    [x] (12990 or 12516) Provided verbal/tactile cueing for activities related to improving balance, coordination, kinesthetic sense, posture, motor skill, proprioception and motor activation to allow for proper function of core, proximal hip and LE with self care and ADLs and functional mobility.   [] (52483) Gait Re-education- Provided training and instruction to the patient for proper LE, core and proximal hip recruitment and positioning and eccentric body weight control with ambulation re-education including up and down stairs     Home Exercise Program:    [x] (02740) Reviewed/Progressed HEP activities related to strengthening, flexibility, endurance, ROM of core, proximal hip and LE for functional self-care, mobility, lifting and ambulation/stair navigation   [] (18278)Reviewed/Progressed HEP activities related to improving balance, coordination, kinesthetic sense, posture, motor skill, proprioception of core, proximal hip and LE for self care, mobility, lifting, and ambulation/stair navigation      Manual Treatments:  PROM / STM / Oscillations-Mobs:  G-I, II, III, IV (PA's, Inf., Post.)  [] (80969) Provided manual therapy to mobilize LE, proximal hip and/or LS spine soft tissue/joints for the purpose of modulating pain, promoting relaxation,  increasing ROM, reducing/eliminating soft tissue swelling/inflammation/restriction, improving soft tissue extensibility and allowing for proper ROM for normal function with self care, mobility, lifting and ambulation. Modalities:     [x] GAME READY (VASO)- for significant edema, swelling, pain control. Charges:  Timed Code Treatment Minutes: 20   Total Treatment Minutes: 30      [] EVAL (LOW) 43226 (typically 20 minutes face-to-face)  [] EVAL (MOD) 27192 (typically 30 minutes face-to-face)  [] EVAL (HIGH) 66553 (typically 45 minutes face-to-face)  [] RE-EVAL     [] PK(74074) x 1    [] DRY NEEDLE 1 OR 2 MUSCLES  [] NMR (88770) x     [] DRY NEEDLE 3+ MUSCLES  [x] Manual (04392) x1       [] TA (99927) x     [] [facility] (87863)  [] ES(attended) (69289)     [] ES (un) (86655):   [] VASO (81197)  [] Other:      GOALS:  Patient stated goal: \"get back to work and be able to squat down\"  [] Progressing: [] Met: [x] Not Met: [] Adjusted    Therapist goals for Patient:   Short Term Goals: To be achieved in: 2 weeks  1. Independent in HEP and progression per patient tolerance, in order to prevent re-injury.    [] Progressing: [] Met: [x] Not Met: [] Adjusted  2. Patient will have a decrease in pain to facilitate improvement in movement, function, and ADLs as indicated by Functional Deficits. [] Progressing: [] Met: [x] Not Met: [] Adjusted    Long Term Goals: To be achieved in: 8 weeks  1. Disability index score of 10% or less for the LEFS to assist with reaching prior level of function. [] Progressing: [] Met: [x] Not Met: [] Adjusted  2. Patient will demonstrate increased AROM to full R knee to allow for proper joint functioning as indicated by patients Functional Deficits. [] Progressing: [] Met: [x] Not Met: [] Adjusted  3. Patient will demonstrate an increase in Strength to good proximal hip strength and control, within 5lb HHD in LE to allow for proper functional mobility as indicated by patients Functional Deficits. [] Progressing: [] Met: [x] Not Met: [] Adjusted  4. Patient will return to daily functional activities without increased symptoms or restriction. [] Progressing: [] Met: [x] Not Met: [] Adjusted  5. Pt will be able to return to work as a  without any increased symptoms or restrictions. [] Progressing: [] Met: [x] Not Met: [] Adjusted    ASSESSMENT:  Good tolerance to treatment. Swelling and bruising improved today from last visit. Limited patellar mobility start of session today, improved w/ mobilizations. Able to achieve 90 deg knee flexion w/ passive range w/ minimal to no symptoms. Lateral soreness improved w/ IT band soft tissue intervention. Reviewed post-op restrictions and HEP.     Return to Play: (if applicable)   [x]  Stage 1: Intro to Strength   []  Stage 2: Return to Run and Strength   []  Stage 3: Return to Jump and Strength   []  Stage 4: Dynamic Strength and Agility   []  Stage 5: Sport Specific Training     []  Ready to Return to Play, Meets All Above Stages   []  Not Ready for Return to Sports   Comments:            Treatment/Activity Tolerance:  [x] Patient tolerated treatment well [] Patient limited by fatique  [] Patient limited by pain  [] Patient limited by other medical complications  [] Other:     Overall Progression Towards Functional goals/ Treatment Progress Update:  [x] Patient is progressing as expected towards functional goals listed. [] Progression is slowed due to complexities/Impairments listed. [] Progression has been slowed due to co-morbidities. [] Plan just implemented, too soon to assess goals progression <30days   [] Goals require adjustment due to lack of progress  [] Patient is not progressing as expected and requires additional follow up with physician  [] Other    Prognosis for POC: [x] Good [] Fair  [] Poor    Patient requires continued skilled intervention: [x] Yes  [] No        PLAN: See eval  [x] Continue per plan of care [] Alter current plan (see comments)  [] Plan of care initiated [] Hold pending MD visit [] Discharge    Electronically signed by: Keerthi Rucker PT    Note: If patient does not return for scheduled/recommended follow up visits, this note will serve as a discharge from care along with the most recent update on progress.

## 2021-09-01 ENCOUNTER — HOSPITAL ENCOUNTER (OUTPATIENT)
Dept: PHYSICAL THERAPY | Age: 61
Setting detail: THERAPIES SERIES
Discharge: HOME | End: 2021-09-01
Payer: COMMERCIAL

## 2021-09-01 PROCEDURE — 97140 MANUAL THERAPY 1/> REGIONS: CPT

## 2021-09-02 NOTE — FLOWSHEET NOTE
Iso/Con/Ecc 0-      Cybex HS curl      TKE      Glute side walks      RDL      Slide Lunge      Slide HS eccentrics      Step ups/ecc step down      Swissball wall rolls- in SLS- hip drive      Quad hip ext/wall-ball rolls      Quad set      Seated hamstring stretch 20 3    Calf strap stretch 20 3          Manual Intervention (18556)  Min: 20      Knee mobs/PROM 8  To 90 deg flex   Tib/Fem Mobs      Patella Mobs 5  Grade 2-3; increased stiffness today   Ankle mobs      STM 8  Quad/quad tendon; IT band         NMR re-education (21192)  Min:   CUES NEEDED   Portuguese/Biofeedback 10/10      BFR      G. Med activation      Hip Ext full ROM/ G. Activation      Bosu Bal and Prop- G Med      Single leg stance/Balance/Prop      Bosu Retro G. Med act      Prone Hip froggers- sliders/elevated            Therapeutic Activity (26444)  Min:      Ladders      Plyos      Dynamic Balance                            Therapeutic Exercise and NMR EXR  [x] (29084) Provided verbal/tactile cueing for activities related to strengthening, flexibility, endurance, ROM for improvements in LE, proximal hip, and core control with self care, mobility, lifting, ambulation. [x] (47158) Provided verbal/tactile cueing for activities related to improving balance, coordination, kinesthetic sense, posture, motor skill, proprioception  to assist with LE, proximal hip, and core control in self care, mobility, lifting, ambulation and eccentric single leg control.      NMR and Therapeutic Activities:    [x] (80822 or 34667) Provided verbal/tactile cueing for activities related to improving balance, coordination, kinesthetic sense, posture, motor skill, proprioception and motor activation to allow for proper function of core, proximal hip and LE with self care and ADLs and functional mobility.   [] (66945) Gait Re-education- Provided training and instruction to the patient for proper LE, core and proximal hip recruitment and positioning and eccentric body weight control with ambulation re-education including up and down stairs     Home Exercise Program:    [x] (11766) Reviewed/Progressed HEP activities related to strengthening, flexibility, endurance, ROM of core, proximal hip and LE for functional self-care, mobility, lifting and ambulation/stair navigation   [] (11794)Reviewed/Progressed HEP activities related to improving balance, coordination, kinesthetic sense, posture, motor skill, proprioception of core, proximal hip and LE for self care, mobility, lifting, and ambulation/stair navigation      Manual Treatments:  PROM / STM / Oscillations-Mobs:  G-I, II, III, IV (PA's, Inf., Post.)  [] (28889) Provided manual therapy to mobilize LE, proximal hip and/or LS spine soft tissue/joints for the purpose of modulating pain, promoting relaxation,  increasing ROM, reducing/eliminating soft tissue swelling/inflammation/restriction, improving soft tissue extensibility and allowing for proper ROM for normal function with self care, mobility, lifting and ambulation. Modalities:     [x] GAME READY (VASO)- for significant edema, swelling, pain control. Charges:  Timed Code Treatment Minutes: 20   Total Treatment Minutes: 30      [] EVAL (LOW) 62083 (typically 20 minutes face-to-face)  [] EVAL (MOD) 51870 (typically 30 minutes face-to-face)  [] EVAL (HIGH) 92301 (typically 45 minutes face-to-face)  [] RE-EVAL     [] BL(34968) x 1    [] DRY NEEDLE 1 OR 2 MUSCLES  [] NMR (13397) x     [] DRY NEEDLE 3+ MUSCLES  [x] Manual (10170) x1       [] TA (93655) x     [] Mech Traction (74056)  [] ES(attended) (77331)     [] ES (un) (83693):   [] VASO (85300)  [] Other:      GOALS:  Patient stated goal: \"get back to work and be able to squat down\"  [] Progressing: [] Met: [x] Not Met: [] Adjusted    Therapist goals for Patient:   Short Term Goals: To be achieved in: 2 weeks  1. Independent in HEP and progression per patient tolerance, in order to prevent re-injury.    [] Progressing: [] Met: [x] Not Met: [] Adjusted  2. Patient will have a decrease in pain to facilitate improvement in movement, function, and ADLs as indicated by Functional Deficits. [] Progressing: [] Met: [x] Not Met: [] Adjusted    Long Term Goals: To be achieved in: 8 weeks  1. Disability index score of 10% or less for the LEFS to assist with reaching prior level of function. [] Progressing: [] Met: [x] Not Met: [] Adjusted  2. Patient will demonstrate increased AROM to full R knee to allow for proper joint functioning as indicated by patients Functional Deficits. [] Progressing: [] Met: [x] Not Met: [] Adjusted  3. Patient will demonstrate an increase in Strength to good proximal hip strength and control, within 5lb HHD in LE to allow for proper functional mobility as indicated by patients Functional Deficits. [] Progressing: [] Met: [x] Not Met: [] Adjusted  4. Patient will return to daily functional activities without increased symptoms or restriction. [] Progressing: [] Met: [x] Not Met: [] Adjusted  5. Pt will be able to return to work as a  without any increased symptoms or restrictions. [] Progressing: [] Met: [x] Not Met: [] Adjusted    ASSESSMENT:  Good tolerance to treatment. No swelling or bruising noted today. Patellar mobility much improved today from last visit. Able to achieve 90 deg knee flexion w/ passive range w/ minimal to no symptoms. Improved posterior tenderness and HS tightness w/ soft tissue and passive stretching. Reviewed post-op restrictions and HEP.       Return to Play: (if applicable)   [x]  Stage 1: Intro to Strength   []  Stage 2: Return to Run and Strength   []  Stage 3: Return to Jump and Strength   []  Stage 4: Dynamic Strength and Agility   []  Stage 5: Sport Specific Training     []  Ready to Return to Play, Meets All Above Stages   []  Not Ready for Return to Sports   Comments:            Treatment/Activity Tolerance:  [x] Patient tolerated treatment well [] Patient limited by fatique  [] Patient limited by pain  [] Patient limited by other medical complications  [] Other:     Overall Progression Towards Functional goals/ Treatment Progress Update:  [x] Patient is progressing as expected towards functional goals listed. [] Progression is slowed due to complexities/Impairments listed. [] Progression has been slowed due to co-morbidities. [] Plan just implemented, too soon to assess goals progression <30days   [] Goals require adjustment due to lack of progress  [] Patient is not progressing as expected and requires additional follow up with physician  [] Other    Prognosis for POC: [x] Good [] Fair  [] Poor    Patient requires continued skilled intervention: [x] Yes  [] No        PLAN: See eval  [x] Continue per plan of care [] Alter current plan (see comments)  [] Plan of care initiated [] Hold pending MD visit [] Discharge    Electronically signed by: Orville Fermin PT    Note: If patient does not return for scheduled/recommended follow up visits, this note will serve as a discharge from care along with the most recent update on progress.

## 2021-09-08 ENCOUNTER — HOSPITAL ENCOUNTER (OUTPATIENT)
Dept: PHYSICAL THERAPY | Age: 61
Setting detail: THERAPIES SERIES
Discharge: HOME | End: 2021-09-08
Payer: COMMERCIAL

## 2021-09-08 PROCEDURE — 97140 MANUAL THERAPY 1/> REGIONS: CPT

## 2021-09-08 NOTE — FLOWSHEET NOTE
[place] [address] [facility]  Phone: [phone] Fax: [phone]    Physical Therapy Treatment Note/ Progress Report:     Date:  2021    Patient Name:  Chacorta Cardoso    :  1960  MRN: [id number]  Restrictions/Precautions:    Medical/Treatment Diagnosis Information:  · Diagnosis: S80.80A comlex tear of medial meniscus, right knee  · Treatment Diagnosis: H61.196  Insurance/Certification information:  PT Insurance Information: [place]; 20 visits/year hard max; auth needed thru [facility]  Physician Information:  Referring Practitioner: Dr. Rodriguez Letters of care signed (Y/N):     Date of Patient follow up with Physician:      Progress Report: []  Yes  [x]  No     Date Range for reporting period:  Beginnin2021  Ending:      Progress report due (10 Rx/or 30 days whichever is less):      Recertification due (POC duration/ or 90 days whichever is less): 10/11/2021     Visit # Insurance Allowable Auth Needed   5 20 hard max; auth thru [facility]- denied; paying cash day of [x]Yes   []No     Latex Allergy:  [x]NO      []YES  Preferred Language for Healthcare:   [x]English       []other:  Functional Scale: LEFS:   Date assessed:2021    Pain level:  2-5/10     SUBJECTIVE:  States knee is doing well, some swelling comes and goes in ankle/foot but knee feeling good. Sees Dr. Cayetano Browning next week for 6 week follow up.       OBJECTIVE: See eval   Observation:    Test measurements:      RESTRICTIONS/PRECAUTIONS: s/p R meniscal root repair DOS: 2021; NWB x6 weeks; no flexion >90 deg x 6 weeks    Exercises/Interventions:     Therapeutic Ex (75657)   Min: 15 Sets/sec Reps Notes/CUES   Retro Stepper/BIKE      Alter G      BFR      Sportcord March      3 way SLR 2 10 HEP   SAQ      Clam ABD 2 10 HEP   Hip Ext /table 2 10 HEP   BOSU fwd/side lunge      BOSU squat      Leg Press Iso/Con/Ecc 0-      Cybex HS curl TKE      Glute side walks      RDL      Slide Lunge      Slide HS eccentrics      Step ups/ecc step down      Swissball wall rolls- in SLS- hip drive      Quad hip ext/wall-ball rolls      Quad set      Seated hamstring stretch 20 3    Calf strap stretch 20 3          Manual Intervention (31143)  Min: 20      Knee mobs/PROM 8  To 90 deg flex   Tib/Fem Mobs      Patella Mobs 5  Grade 2-3; increased stiffness today   Ankle mobs      STM 8  Quad/quad tendon; IT band         NMR re-education (42444)  Min:   CUES NEEDED   [place]izen [place]/Biofeedback 10/10      BFR      G. Med activation      Hip Ext full ROM/ G. Activation      Bosu Bal and Prop- G Med      Single leg stance/Balance/Prop      Bosu Retro G. Med act      Prone Hip froggers- sliders/elevated            Therapeutic Activity (26338)  Min:      Ladders      Plyos      Dynamic Balance                            Therapeutic Exercise and NMR EXR  [x] (61023) Provided verbal/tactile cueing for activities related to strengthening, flexibility, endurance, ROM for improvements in LE, proximal hip, and core control with self care, mobility, lifting, ambulation. [x] (12108) Provided verbal/tactile cueing for activities related to improving balance, coordination, kinesthetic sense, posture, motor skill, proprioception  to assist with LE, proximal hip, and core control in self care, mobility, lifting, ambulation and eccentric single leg control.      NMR and Therapeutic Activities:    [x] (48208 or 33768) Provided verbal/tactile cueing for activities related to improving balance, coordination, kinesthetic sense, posture, motor skill, proprioception and motor activation to allow for proper function of core, proximal hip and LE with self care and ADLs and functional mobility.   [] (14238) Gait Re-education- Provided training and instruction to the patient for proper LE, core and proximal hip recruitment and positioning and eccentric body weight control with ambulation re-education including up and down stairs     Home Exercise Program:    [x] (35850) Reviewed/Progressed HEP activities related to strengthening, flexibility, endurance, ROM of core, proximal hip and LE for functional self-care, mobility, lifting and ambulation/stair navigation   [] (02753)Reviewed/Progressed HEP activities related to improving balance, coordination, kinesthetic sense, posture, motor skill, proprioception of core, proximal hip and LE for self care, mobility, lifting, and ambulation/stair navigation      Manual Treatments:  PROM / STM / Oscillations-Mobs:  G-I, II, III, IV (PA's, Inf., Post.)  [] (29849) Provided manual therapy to mobilize LE, proximal hip and/or LS spine soft tissue/joints for the purpose of modulating pain, promoting relaxation,  increasing ROM, reducing/eliminating soft tissue swelling/inflammation/restriction, improving soft tissue extensibility and allowing for proper ROM for normal function with self care, mobility, lifting and ambulation. Modalities:     [x] GAME READY (VASO)- for significant edema, swelling, pain control. Charges:  Timed Code Treatment Minutes: 20   Total Treatment Minutes: 30      [] EVAL (LOW) 35812 (typically 20 minutes face-to-face)  [] EVAL (MOD) 46062 (typically 30 minutes face-to-face)  [] EVAL (HIGH) 99531 (typically 45 minutes face-to-face)  [] RE-EVAL     [] OD(10009) x 1    [] DRY NEEDLE 1 OR 2 MUSCLES  [] NMR (63614) x     [] DRY NEEDLE 3+ MUSCLES  [x] Manual (60573) x1       [] TA (04559) x     [] Mech Traction (53287)  [] ES(attended) (16093)     [] ES (un) (02502):   [] VASO (17259)  [] Other:      GOALS:  Patient stated goal: \"get back to work and be able to squat down\"  [] Progressing: [] Met: [x] Not Met: [] Adjusted    Therapist goals for Patient:   Short Term Goals: To be achieved in: 2 weeks  1. Independent in HEP and progression per patient tolerance, in order to prevent re-injury.    [] Progressing: [] Met: [x] Not Met: [] Adjusted  2. Patient will have a decrease in pain to facilitate improvement in movement, function, and ADLs as indicated by Functional Deficits. [] Progressing: [] Met: [x] Not Met: [] Adjusted    Long Term Goals: To be achieved in: 8 weeks  1. Disability index score of 10% or less for the LEFS to assist with reaching prior level of function. [] Progressing: [] Met: [x] Not Met: [] Adjusted  2. Patient will demonstrate increased AROM to full R knee to allow for proper joint functioning as indicated by patients Functional Deficits. [] Progressing: [] Met: [x] Not Met: [] Adjusted  3. Patient will demonstrate an increase in Strength to good proximal hip strength and control, within 5lb HHD in LE to allow for proper functional mobility as indicated by patients Functional Deficits. [] Progressing: [] Met: [x] Not Met: [] Adjusted  4. Patient will return to daily functional activities without increased symptoms or restriction. [] Progressing: [] Met: [x] Not Met: [] Adjusted  5. Pt will be able to return to work as a  without any increased symptoms or restrictions. [] Progressing: [] Met: [x] Not Met: [] Adjusted    ASSESSMENT:  Good tolerance to treatment. No swelling or bruising noted today. Patellar mobility much improved today from last visit. Able to achieve 90 deg knee flexion w/ passive range w/ minimal to no symptoms. Improved posterior tenderness and HS tightness w/ soft tissue and passive stretching. Reviewed post-op restrictions and HEP. Progress next week at 6 week kiet as able and tolerated.     Return to Play: (if applicable)   [x]  Stage 1: Intro to Strength   []  Stage 2: Return to Run and Strength   []  Stage 3: Return to Jump and Strength   []  Stage 4: Dynamic Strength and Agility   []  Stage 5: Sport Specific Training     []  Ready to Return to Play, Meets All Above Stages   []  Not Ready for Return to Sports   Comments:            Treatment/Activity Tolerance:  [x] Patient tolerated treatment well [] Patient limited by fatique  [] Patient limited by pain  [] Patient limited by other medical complications  [] Other:     Overall Progression Towards Functional goals/ Treatment Progress Update:  [x] Patient is progressing as expected towards functional goals listed. [] Progression is slowed due to complexities/Impairments listed. [] Progression has been slowed due to co-morbidities. [] Plan just implemented, too soon to assess goals progression <30days   [] Goals require adjustment due to lack of progress  [] Patient is not progressing as expected and requires additional follow up with physician  [] Other    Prognosis for POC: [x] Good [] Fair  [] Poor    Patient requires continued skilled intervention: [x] Yes  [] No        PLAN: See eval  [x] Continue per plan of care [] Alter current plan (see comments)  [] Plan of care initiated [] Hold pending MD visit [] Discharge    Electronically signed by: Nicolasa Barragan PT    Note: If patient does not return for scheduled/recommended follow up visits, this note will serve as a discharge from care along with the most recent update on progress.

## 2021-09-16 ENCOUNTER — HOSPITAL ENCOUNTER (OUTPATIENT)
Dept: PHYSICAL THERAPY | Age: 61
Setting detail: THERAPIES SERIES
Discharge: HOME | End: 2021-09-16
Payer: COMMERCIAL

## 2021-09-16 PROCEDURE — 9990000027 HC GAP GROUP

## 2021-09-16 NOTE — FLOWSHEET NOTE
[place] [address] [address] [place]  Phone: [phone] Fax: [phone]    GAP Program-Treatment Note     Date:  2021    Patient Name:  Lb Brizuela    :  1960  MRN: [id number]    Medical/Treatment Diagnosis Information:  ·  Diagnosis: J93.202J comlex tear of medial meniscus, right knee  · Treatment Diagnosis: O46.074  Insurance/Certification information:  PT Insurance Information: [place]; 20 visits/year hard max; auth needed thru [facility]IM  Physician Information:   Referring Practitioner: Dr. Brigida Soares    Date of Patient follow up with Physician:     Latex Allergy:  [x]NO      []YES    RESTRICTIONS/PRECAUTIONS: s/p R meniscal root repair DOS: 2021; NWB x6 weeks; no flexion >90 deg x 6 weeks    GAP Visit #   1     Pain level:  /10     SUBJECTIVE:  The knee feels pretty good. Pt is eager to get back to work. Pt just had a follow-up with Dr. Micaela Conway and said that he is now allowed to put whatever weight he wants on the R leg. OBJECTIVE: Pt ambulates into clinic with 1 crutch under R UE.  PTA instructed Pt that the crutch should go under the L arm if he is able to do that. Reviewed \"heel to toe\" form for gait to normalize use of the R LE when ambulating. Exercises/Interventions:     EXERCISE Sets/sec Reps Notes   QS 10 10 towel   Heel Slides for flexion 5sec 15 Towel on board, green strap   BOSU Lunge 10 10 Cues for form   Hip hinge to wall 10 10 30 deg   SLS marches c holds 3sec 10 Cues for posture   SLS with hip abduction 2 10 1 set per side   DL RDL with bar for form 1 10 5lb bar behind back, poor form   Step ups 2 10 4inch         TKE   add   Leg Press Ecc   add   Band side stepping   add                                                                                                 Charges:  [] GAP EVAL  [x] GAP GROUP          ASSESSMENT:  Good tolerance to addition of closed chain exercises overall. Pt appropriately fatigued at conclusion without much soreness reported in the knee. Return to Play: (if applicable)   []  Stage 1: Intro to Strength   []  Stage 2: Return to Run and Strength   []  Stage 3: Return to Jump and Strength   []  Stage 4: Dynamic Strength and Agility   []  Stage 5: Sport Specific Training     []  Ready to Return to Play, Meets All Above Stages   []  Not Ready for Return to Sports   Comments:                         Treatment/Activity Tolerance:  [x] Patient tolerated treatment well [] Patient limited by fatique  [] Patient limited by pain  [] Patient limited by other medical complications  [] Other:         PLAN: Continue POC, working on closed chain exercise and proprioception in order to restore function and strength so that 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    [x] Continue per plan of care [] Alter current plan (see comments)  [] Plan of care initiated [] Hold pending MD visit [] Discharge    Electronically signed by: Yvonne Feliciano, PTA [id number]

## 2021-09-21 ENCOUNTER — HOSPITAL ENCOUNTER (OUTPATIENT)
Dept: PHYSICAL THERAPY | Age: 61
Setting detail: THERAPIES SERIES
Discharge: HOME | End: 2021-09-21
Payer: COMMERCIAL

## 2021-09-21 PROCEDURE — 9990000027 HC GAP GROUP

## 2021-09-21 NOTE — FLOWSHEET NOTE
[place] [address] [address] [place]  Phone: [phone] Fax: [phone]    GAP Program-Treatment Note     Date:  2021    Patient Name:  Clay Jimenez    :  1960  MRN: [id number]    Medical/Treatment Diagnosis Information:  ·  Diagnosis: B85.728H comlex tear of medial meniscus, right knee  · Treatment Diagnosis: I25.698  Insurance/Certification information:  PT Insurance Information: [place]; 20 visits/year hard max; auth needed thru [facility]IM  Physician Information:   Referring Practitioner: Dr. Frederic Norris    Date of Patient follow up with Physician:     Latex Allergy:  [x]NO      []YES    RESTRICTIONS/PRECAUTIONS: s/p R meniscal root repair DOS: 2021; NWB x6 weeks; no flexion >90 deg x 6 weeks    GAP Visit #   2     Pain level:  /10     SUBJECTIVE:  Pt reports a little swelling in the knee over the weekend by the end of each day, but icing has been helpful and the swelling is mostly down by the next morning. OBJECTIVE: Pt ambulates into clinic with 1 crutch under L UE. Discussed keeping the crutch for outdoors where ground is not level and for long distances. OK for Pt to go without crutch in the house and for short distances if the knee is not swelling and Pt has normal gait.     Exercises/Interventions:     EXERCISE Sets/sec Reps Notes   QS towel   Heel Slides for flexion 5sec 15 Towel on board, green strap   BOSU Lunge 10 10 Cues for form   Hip hinge to wall 30 deg   SLS 5-10sec 10 Cues for posture, 0 deg on Airex, 30 deg on level ground   SLS with hip abduction 1 set per side   DL RDL with bar for form 5lb bar behind back, poor form   Step ups 4inch   SAQ 5sec 15 3lb, full roll   TKE 5sec 15 4pl   Leg Press Ecc   add   Band side stepping 2 15 Green loop at shins, Pt walking next to table                                                                                                 Charges:  [] [facility]  [x] GAP GROUP          ASSESSMENT:  Good tolerance to addition of closed chain exercises overall. Pt appropriately fatigued at conclusion without much soreness reported in the knee. Return to Play: (if applicable)   []  Stage 1: Intro to Strength   []  Stage 2: Return to Run and Strength   []  Stage 3: Return to Jump and Strength   []  Stage 4: Dynamic Strength and Agility   []  Stage 5: Sport Specific Training     []  Ready to Return to Play, Meets All Above Stages   []  Not Ready for Return to Sports   Comments:                         Treatment/Activity Tolerance:  [x] Patient tolerated treatment well [] Patient limited by fatique  [] Patient limited by pain  [] Patient limited by other medical complications  [] Other:         PLAN: Continue POC, working on closed chain exercise and proprioception in order to restore function and strength so that 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    [x] Continue per plan of care [] Alter current plan (see comments)  [] Plan of care initiated [] Hold pending MD visit [] Discharge    Electronically signed by: Robert Kuhn, PTA [id number]

## 2021-09-23 ENCOUNTER — HOSPITAL ENCOUNTER (OUTPATIENT)
Dept: PHYSICAL THERAPY | Age: 61
Setting detail: THERAPIES SERIES
Discharge: HOME | End: 2021-09-23
Payer: COMMERCIAL

## 2021-09-23 PROCEDURE — 9990000027 HC GAP GROUP

## 2021-09-23 NOTE — FLOWSHEET NOTE
[place] [address] [address] [place]  Phone: [phone] Fax: [phone]    GAP Program-Treatment Note     Date:  2021    Patient Name:  Julia Chávez    :  1960  MRN: [id number]    Medical/Treatment Diagnosis Information:  ·  Diagnosis: R50.489A comlex tear of medial meniscus, right knee  · Treatment Diagnosis: B77.758  Insurance/Certification information:  PT Insurance Information: [place]; 20 visits/year hard max; auth needed thru AIM  Physician Information:   Referring Practitioner: Dr. Africa Bowden    Date of Patient follow up with Physician:     Latex Allergy:  [x]NO      []YES    RESTRICTIONS/PRECAUTIONS: s/p R meniscal root repair DOS: 2021; NWB x6 weeks; no flexion >90 deg x 6 weeks    GAP Visit #   3     Pain level:  /10     SUBJECTIVE:  Pt is about 7.5 weeks out of surgery. Pt resumed working for a few hours over the last couple of days. Pt did need to stop and go home and ice as the knee was getting sore. Pt is keeping the crutch in the car in case he needs it at this point. OBJECTIVE: No crutches today. More visible swelling compared to last visit. Discussed limiting time at work so that swelling does not get out of control.        Exercises/Interventions:     EXERCISE Sets/sec Reps Notes   QS towel   Heel Slides for flexion 5sec 15 Towel on board, green strap   BOSU Lunge 10 10 Cues for form   Hip hinge to wall 30 deg   SLS 5-10sec 10 Cues for posture, 0 deg on Airex, 30 deg on level ground   SLS with hip abduction 1 set per side   DL RDL with bar for form 5lb bar behind back, poor form   Step ups 4inch   LAQ 5sec 15 4lb   TKE 5sec 15 Blue band   Leg Press Ecc   add   Band side stepping 2 15 Green loop at shins, Pt walking next to table   gastroc stretch 30 3 Black wedge at wall   Sit to stand 1 10 Raised table, not resting   Bridges 10 10 Charges:  [] GAP EVAL  [x] GAP GROUP          ASSESSMENT:  Good tolerance to closed chain exercises overall. Pt appropriately fatigued at conclusion with minimal soreness. Overall tolerating WB well, though having some increase with swelling as Pt spends more time on his feet. Return to Play: (if applicable)   []  Stage 1: Intro to Strength   []  Stage 2: Return to Run and Strength   []  Stage 3: Return to Jump and Strength   []  Stage 4: Dynamic Strength and Agility   []  Stage 5: Sport Specific Training     []  Ready to Return to Play, Meets All Above Stages   []  Not Ready for Return to Sports   Comments:                         Treatment/Activity Tolerance:  [x] Patient tolerated treatment well [] Patient limited by fatique  [] Patient limited by pain  [] Patient limited by other medical complications  [] Other:         PLAN: Continue POC, working on closed chain exercise and proprioception in order to restore function and strength so that 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    [x] Continue per plan of care [] Alter current plan (see comments)  [] Plan of care initiated [] Hold pending MD visit [] Discharge    Electronically signed by: Andrea Miramontes, PTA [id number]

## 2021-09-28 ENCOUNTER — HOSPITAL ENCOUNTER (OUTPATIENT)
Dept: PHYSICAL THERAPY | Age: 61
Setting detail: THERAPIES SERIES
Discharge: HOME | End: 2021-09-28
Payer: COMMERCIAL

## 2021-09-28 PROCEDURE — 9990000027 HC GAP GROUP

## 2021-09-28 NOTE — FLOWSHEET NOTE
[place] [address] [address] [place]  Phone: [phone] Fax: [phone]    GAP Program-Treatment Note     Date:  2021    Patient Name:  Tracey Rodriguez    :  1960  MRN: [id number]    Medical/Treatment Diagnosis Information:  ·  Diagnosis: Z75.741R comlex tear of medial meniscus, right knee  · Treatment Diagnosis: T82.920  Insurance/Certification information:  PT Insurance Information: [place]; 20 visits/year hard max; auth needed thru AIM  Physician Information:   Referring Practitioner: Dr. Gilbert Granda    Date of Patient follow up with Physician:     Latex Allergy:  [x]NO      []YES    RESTRICTIONS/PRECAUTIONS: s/p R meniscal root repair DOS: 2021; NWB x6 weeks; no flexion >90 deg x 6 weeks    GAP Visit #   4     Pain level:  /10     SUBJECTIVE:  Pt worked until 5 on Friday and had some swelling in the knee and ankle, but did better over the weekend. Pt worked this morning and may be going back after PT today. \"I feel better each time I come to therapy. \"    OBJECTIVE: Swelling about the same as last visit, but Pt has just spent half a day at work.      Exercises/Interventions:     EXERCISE Sets/sec Reps Notes   QS towel   Heel Slides for flexion 5sec 15 Towel on board, green strap   BOSU Lunge 10 10 Cues for form   Hip hinge to wall 30 deg   SLS 5-10sec 10 Cues for posture, 0 deg on Airex, 30 deg on level ground   SLS with hip abduction 1 set per side   DL RDL with bar for form 5lb bar behind back, poor form   Step ups 4inch   LAQ 5sec 15 4lb   TKE 5sec 15 Blue band   Leg Press Ecc   add   Band side stepping 2 15 Green loop at shins, Pt walking next to table   gastroc stretch 30 3 Black wedge at wall   Sit to stand 1 10 Raised table, not resting   Bridges 10 10                                                                                Charges:  [] GAP EVAL  [x] GAP GROUP          ASSESSMENT:  Good tolerance to closed chain exercises overall. Pt appropriately fatigued at conclusion with minimal soreness. Overall tolerating WB well, though having some increase with swelling as Pt spends more time on his feet at work. Icing and rest seem to be helpful for decreasing R LE swelling. Return to Play: (if applicable)   []  Stage 1: Intro to Strength   []  Stage 2: Return to Run and Strength   []  Stage 3: Return to Jump and Strength   []  Stage 4: Dynamic Strength and Agility   []  Stage 5: Sport Specific Training     []  Ready to Return to Play, Meets All Above Stages   []  Not Ready for Return to Sports   Comments:                         Treatment/Activity Tolerance:  [x] Patient tolerated treatment well [] Patient limited by fatique  [] Patient limited by pain  [] Patient limited by other medical complications  [] Other:         PLAN: Continue POC, working on closed chain exercise and proprioception in order to restore function and strength so that 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    [x] Continue per plan of care [] Alter current plan (see comments)  [] Plan of care initiated [] Hold pending MD visit [] Discharge    Electronically signed by: Elizabeth Badillo, PTA [id number]

## 2021-09-30 ENCOUNTER — HOSPITAL ENCOUNTER (OUTPATIENT)
Dept: PHYSICAL THERAPY | Age: 61
Setting detail: THERAPIES SERIES
Discharge: HOME | End: 2021-09-30
Payer: COMMERCIAL

## 2021-09-30 PROCEDURE — 9990000027 HC GAP GROUP

## 2021-09-30 NOTE — FLOWSHEET NOTE
[address]  Phone: [phone] Fax: [phone]    GAP Program-Treatment Note     Date:  2021    Patient Name:  Raeann Schroeder    :  1960  MRN: [id number]    Medical/Treatment Diagnosis Information:  ·  Diagnosis: Y86.513K comlex tear of medial meniscus, right knee  · Treatment Diagnosis: T35.605  Insurance/Certification information:  PT Insurance Information: [place]; 20 visits/year hard max; auth needed thru AIM  Physician Information:   Referring Practitioner: Dr. Geraldine Hobson    Date of Patient follow up with Physician:     Latex Allergy:  [x]NO      []YES    RESTRICTIONS/PRECAUTIONS: s/p R meniscal root repair DOS: 2021; NWB x6 weeks; no flexion >90 deg x 6 weeks    GAP Visit #   5     Pain level:  /10     SUBJECTIVE:  Pt is 8.5 weeks post-op. Pt worked a full day yesterday and the swelling was back down for the most part by this morning. Pt is unable to do deep knee bending with weight on it and Pt notices that the R LE fatigues quickly when working under a dashboard. \"I'd be happy with 80% use of the leg as I only had about 60% before surgery. \"     OBJECTIVE: Swelling present above the patella and down through distal R LE. Exercises/Interventions:     EXERCISE Sets/sec Reps Notes   bike 5 minResistance 7   QS towel   Heel Slides for flexion 5sec 15 Towel on board, green strap   BOSU Lunge 10 10 Cues for form   Hip hinge to wall 30 deg   SLS 5-10sec 10 Cues for posture,  20 deg on Airex   SLS with hip abduction 1 set per side   DL RDL with bar for form 5lb bar behind back, poor form   Step ups 4inch   LAQ 4lb   TKE 5sec 15 4pl   Leg Press Ecc 2 10 70lb 2-1, 110 DL at 70 deg. Band side stepping 2 15 orange at shins   gastroc stretch Black wedge at wall   Sit to stand- eccentric 2 10 Chair+Airex, 2-1 as able, minimizing assist from L LE on the way down to the chair per dom. Sarai brown 5sec 12                                                                          Charges:  [] GAP EVAL  [x] GAP GROUP          ASSESSMENT:  Good tolerance to closed chain exercises overall; appropriately fatigued without complaints of increased pain. Overall tolerating WB well, though having some increase with swelling as Pt spends more time on his feet. Progressing with strength and tolerance to weight bearing exercise each visit. Return to Play: (if applicable)   []  Stage 1: Intro to Strength   []  Stage 2: Return to Run and Strength   []  Stage 3: Return to Jump and Strength   []  Stage 4: Dynamic Strength and Agility   []  Stage 5: Sport Specific Training     []  Ready to Return to Play, Meets All Above Stages   []  Not Ready for Return to Sports   Comments:                         Treatment/Activity Tolerance:  [x] Patient tolerated treatment well [] Patient limited by fatique  [] Patient limited by pain  [] Patient limited by other medical complications  [] Other:         PLAN: Continue POC, working on closed chain exercise and proprioception in order to restore function and strength so that 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    [x] Continue per plan of care [] Alter current plan (see comments)  [] Plan of care initiated [] Hold pending MD visit [] Discharge    Electronically signed by: Lizandro Shearer, PTA [id number]

## 2021-10-05 ENCOUNTER — HOSPITAL ENCOUNTER (OUTPATIENT)
Dept: PHYSICAL THERAPY | Age: 61
Setting detail: THERAPIES SERIES
Discharge: HOME | End: 2021-10-05
Payer: COMMERCIAL

## 2021-10-05 PROCEDURE — 9990000027 HC GAP GROUP

## 2021-10-05 NOTE — FLOWSHEET NOTE
[place] [address] [facility]  Phone: [phone] Fax: [phone]    GAP Program-Treatment Note     Date:  10/5/2021    Patient Name:  Haley Flowers    :  1960  MRN: [id number]    Medical/Treatment Diagnosis Information:  ·  Diagnosis: D53.705T comlex tear of medial meniscus, right knee  · Treatment Diagnosis: V64.739  Insurance/Certification information:  PT Insurance Information: [place]; 20 visits/year hard max; auth needed thru AIM  Physician Information:   Referring Practitioner: Dr. Collette Moose    Date of Patient follow up with Physician:     Latex Allergy:  [x]NO      []YES    RESTRICTIONS/PRECAUTIONS: s/p R meniscal root repair DOS: 2021; NWB x6 weeks; no flexion >90 deg x 6 weeks    GAP Visit #   6     Pain level:  /10     SUBJECTIVE:  Pt feels like the swelling is down a little more today, though the outside of the ankle still swells too. Work is going fairly well, though he did leave early on Friday as the knee was getting sore and swollen. Pt states that he uses the L leg to go down stairs and does not use the R much with this yet. OBJECTIVE: Swelling present above the patella and down through distal R LE. Exercises/Interventions:     EXERCISE Sets/sec Reps Notes   bike 5 minResistance 7   QS towel   Heel Slides for flexion Towel on board, green strap   BOSU Lunge 10 10 Cues for form   Hip hinge to wall 30 deg   SLS 5-10sec 10 Cues for posture,  20 deg on Airex   SLS with hip abduction 1 set per side   DL RDL with bar for form 5lb bar behind back, poor form   Step ups 2 10 6 inch   LAQ 5sec 15 5lb   TKE 4pl   Leg Press Ecc 2 10 80lb 2-1,   Band side stepping orange at shins   gastroc stretch Black wedge at wall   Sit to stand- eccentric 2 10 Hi-lo, 2-1 as able, minimizing assist from L LE on the way down to table per dom.    Bridges    Slide lunges 5sec 12 Cues for keeping weight in R LE                                                                         Charges:  [] GAP EVAL  [x] GAP GROUP          ASSESSMENT:  Good tolerance to closed chain exercises overall; improved tolerance to step work and sit to stand; appropriately fatigued without complaints of increased pain. Overall tolerating WB well, though having some swelling as Pt spends more time on his feet throughout the work day. Progressing with strength and tolerance to closed chain exercise and proprioception each visit. Return to Play: (if applicable)   []  Stage 1: Intro to Strength   []  Stage 2: Return to Run and Strength   []  Stage 3: Return to Jump and Strength   []  Stage 4: Dynamic Strength and Agility   []  Stage 5: Sport Specific Training     []  Ready to Return to Play, Meets All Above Stages   []  Not Ready for Return to Sports   Comments:                         Treatment/Activity Tolerance:  [x] Patient tolerated treatment well [] Patient limited by fatique  [] Patient limited by pain  [] Patient limited by other medical complications  [] Other:         PLAN: Continue POC, working on closed chain exercise and proprioception in order to restore function and strength so that 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    [x] Continue per plan of care [] Alter current plan (see comments)  [] Plan of care initiated [] Hold pending MD visit [] Discharge    Electronically signed by: Korina Scanlon, PTA [id number]

## 2021-10-07 ENCOUNTER — HOSPITAL ENCOUNTER (OUTPATIENT)
Dept: PHYSICAL THERAPY | Age: 61
Setting detail: THERAPIES SERIES
Discharge: HOME | End: 2021-10-07
Payer: COMMERCIAL

## 2021-10-07 PROCEDURE — 9990000027 HC GAP GROUP

## 2021-10-07 NOTE — FLOWSHEET NOTE
[place] [address] [facility]  Phone: [phone] Fax: [phone]    GAP Program-Treatment Note     Date:  10/7/2021    Patient Name:  Cassandra Ennis    :  1960  MRN: [id number]    Medical/Treatment Diagnosis Information:  ·  Diagnosis: J54.877G comlex tear of medial meniscus, right knee  · Treatment Diagnosis: A14.174  Insurance/Certification information:  PT Insurance Information: [place]; 20 visits/year hard max; auth needed thru AIM  Physician Information:   Referring Practitioner: Dr. Swann Phleraul    Date of Patient follow up with Physician:     Latex Allergy:  [x]NO      []YES    RESTRICTIONS/PRECAUTIONS: s/p R meniscal root repair DOS: 2021; NWB x6 weeks; no flexion >90 deg x 6 weeks    GAP Visit #   7     Pain level:  /10     SUBJECTIVE:  Pt feels like the swelling is gradually a little less. The ankle was less swollen by the end of the day yesterday. Pt has been getting on his knees a little at work and has been putting something underneath to provide a little cushion. Pt reports having difficulty getting back up once he is kneeling on the floor. 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 OBJECTIVE: Swelling present above the patella and down through distal R LE.      Exercises/Interventions:     EXERCISE Sets/sec Reps Notes   bike 5 minResistance 7   QS towel   Heel Slides for flexion Towel on board, green strap   BOSU Lunge 10 10 Cues for form   Hip hinge to wall 30 deg   steamboats 3 10 Airex, no band, at wall for assist as needed   SLS with hip abduction 1 set per side   DL RDL with bar for form 5lb bar behind back, poor form   Step ups 2 10 8 inch   LAQ 5lb   TKE 4pl   Leg Press Ecc 2 10 80lb 2-1,   Band side stepping orange at shins   gastroc stretch Black wedge at wall   Sit to stand- eccentric 2 10 Hi-lo, 2-1 as able, minimizing assist from L LE on the way down to table per dom. Bridges    Slide lunges 5sec 12 Cues for keeping weight in R LE   Eccentric stand to kneel 2 10 3 Airex pads under knee, TRX assist if needed   Glute step up for work heights 2 10 12in, TRX assist as needed                                                             Charges:  [] GAP EVAL  [x] GAP GROUP          ASSESSMENT:  Good tolerance to closed chain exercises overall; tolerance to deeper angles is improving; appropriately fatigued without complaints of increased pain. Overall tolerating WB well, though having some swelling as Pt spends more time on his feet throughout the work day. Progressing with strength and tolerance to closed chain exercise and proprioception each visit. Return to Play: (if applicable)   []  Stage 1: Intro to Strength   []  Stage 2: Return to Run and Strength   []  Stage 3: Return to Jump and Strength   []  Stage 4: Dynamic Strength and Agility   []  Stage 5: Sport Specific Training     []  Ready to Return to Play, Meets All Above Stages   []  Not Ready for Return to Sports   Comments:                         Treatment/Activity Tolerance:  [x] Patient tolerated treatment well [] Patient limited by fatique  [] Patient limited by pain  [] Patient limited by other medical complications  [] Other:         PLAN: Continue POC, working on closed chain exercise and proprioception in order to restore function and strength so that 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    [x] Continue per plan of care [] Alter current plan (see comments)  [] Plan of care initiated [] Hold pending MD visit [] Discharge    Electronically signed by: Rochelle Benites, PTA [id number]

## 2021-10-12 ENCOUNTER — HOSPITAL ENCOUNTER (OUTPATIENT)
Dept: PHYSICAL THERAPY | Age: 61
Setting detail: THERAPIES SERIES
Discharge: HOME | End: 2021-10-12
Payer: COMMERCIAL

## 2021-10-12 PROCEDURE — 9990000027 HC GAP GROUP

## 2021-10-12 NOTE — FLOWSHEET NOTE
[place] [address] [address] [place]  Phone: [phone] Fax: [phone]    GAP Program-Treatment Note     Date:  10/12/2021    Patient Name:  Sadi An    :  1960  MRN: [id number]    Medical/Treatment Diagnosis Information:  ·  Diagnosis: H21.286D comlex tear of medial meniscus, right knee  · Treatment Diagnosis: M02.671  Insurance/Certification information:  PT Insurance Information: [place]; 20 visits/year hard max; auth needed thru AIM  Physician Information:   Referring Practitioner: Dr. Viridiana Fisher    Date of Patient follow up with Physician:     Latex Allergy:  [x]NO      []YES    RESTRICTIONS/PRECAUTIONS: s/p R meniscal root repair DOS: 2021; NWB x6 weeks; no flexion >90 deg x 6 weeks    GAP Visit #   8     Pain level:  /10     SUBJECTIVE:  Pt notices less and less swelling through the knee and lower leg. Thinks he is progressing well. OBJECTIVE: Swelling present above the patella and down through distal R LE.      Exercises/Interventions:     EXERCISE Sets/sec Reps Notes   bike 5 minResistance 7   QS towel   Heel Slides for flexion Towel on board, green strap   BOSU Lunge 10 10 Cues for form   Hip hinge to wall 30 deg   steamboats 3 10 Airex, no band, at wall for assist as needed   SLS with hip abduction 1 set per side   DL RDL with bar for form 5lb bar behind back, poor form   Step ups 2 10 8 inch   LAQ 5lb   TKE 4pl   Leg Press Ecc 2 10 80lb 2-1,   Band side stepping orange at shins   gastroc stretch Black wedge at wall   Sit to stand- eccentric 2 10 Chair, wt shift to R per dom   Bridges    Slide lunges 5sec 15 Cues for keeping weight in R LE   Eccentric stand to kneel 2 10 3 Airex pads under knee, TRX assist if needed   Glute step up for work heights 2 10 12in, TRX assist as needed                                                             Charges:  [] GAP EVAL  [x] GAP GROUP          ASSESSMENT: Good tolerance to closed chain exercises overall; tolerance to deeper angles is improving; appropriately fatigued without complaints of increased pain. Overall tolerating WB well, though having some swelling as Pt spends more time on his feet throughout the work day. Progressing with strength and tolerance to closed chain exercise and proprioception each visit. Return to Play: (if applicable)   []  Stage 1: Intro to Strength   []  Stage 2: Return to Run and Strength   []  Stage 3: Return to Jump and Strength   []  Stage 4: Dynamic Strength and Agility   []  Stage 5: Sport Specific Training     []  Ready to Return to Play, Meets All Above Stages   []  Not Ready for Return to Sports   Comments:                         Treatment/Activity Tolerance:  [x] Patient tolerated treatment well [] Patient limited by fatique  [] Patient limited by pain  [] Patient limited by other medical complications  [] Other:         PLAN: Continue POC, working on closed chain exercise and proprioception in order to restore function and strength so that 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 Decrease frequency to 1x/week.   [x] Continue per plan of care [] Alter current plan (see comments)  [] Plan of care initiated [] Hold pending MD visit [] Discharge    Electronically signed by: Ania Rod, PTA [id number]

## 2021-10-21 ENCOUNTER — HOSPITAL ENCOUNTER (OUTPATIENT)
Dept: PHYSICAL THERAPY | Age: 61
Setting detail: THERAPIES SERIES
Discharge: HOME | End: 2021-10-21
Payer: COMMERCIAL

## 2021-10-21 PROCEDURE — 9990000027 HC GAP GROUP

## 2021-10-21 NOTE — FLOWSHEET NOTE
[place] [address] [address] [place]  Phone: [phone] Fax: [phone]    GAP Program-Treatment Note     Date:  10/21/2021    Patient Name:  Drake Rios    :  1960  MRN: [id number]    Medical/Treatment Diagnosis Information:  ·  Diagnosis: K72.995V comlex tear of medial meniscus, right knee  · Treatment Diagnosis: M66.194  Insurance/Certification information:  PT Insurance Information: [place]; 20 visits/year hard max; auth needed thru AIM  Physician Information:   Referring Practitioner: Dr. Martha Arambula    Date of Patient follow up with Physician:     Latex Allergy:  [x]NO      []YES    RESTRICTIONS/PRECAUTIONS: s/p R meniscal root repair DOS: 2021; NWB x6 weeks; no flexion >90 deg x 6 weeks    GAP Visit #   9     Pain level:  /10     SUBJECTIVE:  Pt states that the leg is about 85%. Pt has been stepping up with that side and feeling like he has better control with little to no swelling at the end of the work day. OBJECTIVE: Swelling present above the patella and down through distal R LE.      Exercises/Interventions:     EXERCISE Sets/sec Reps Notes   bike 5 minResistance 7   QS towel   Heel Slides for flexion Towel on board, green strap   BOSU Lunge 10 10 Cues for form   Hip hinge to wall 30 deg   steamboats 3 10 Airex, no band, at wall for assist as needed   SLS with hip abduction 1 set per side   DL RDL with bar for form 5lb bar behind back, poor form   Step ups 2 10 8 inch   LAQ 5lb   TKE 4pl   Leg Press Ecc 2 10 80lb 2-1,   Band side stepping orange at shins   gastroc stretch Black wedge at wall   Sit to stand- eccentric 2 10 Chair, wt shift to R per dom   Bridges    Slide lunges 5sec 15 Cues for keeping weight in R LE   Eccentric stand to kneel 2 10 3 Airex pads under knee, TRX assist if needed   Glute step up for work heights 2 10 12in, TRX assist as needed Charges:  [] GAP EVAL  [x] GAP GROUP          ASSESSMENT:  Good tolerance to closed chain exercises overall; tolerance to deeper angles is improving; appropriately fatigued without complaints of increased pain. Pt to try working for the next 1.5-2 weeks independently and will report back to PT if he is having any issues which require our help. Return to Play: (if applicable)   []  Stage 1: Intro to Strength   []  Stage 2: Return to Run and Strength   []  Stage 3: Return to Jump and Strength   []  Stage 4: Dynamic Strength and Agility   []  Stage 5: Sport Specific Training     []  Ready to Return to Play, Meets All Above Stages   []  Not Ready for Return to Sports   Comments:                         Treatment/Activity Tolerance:  [x] Patient tolerated treatment well [] Patient limited by fatique  [] Patient limited by pain  [] Patient limited by other medical complications  [] Other:         PLAN: Hold PT at this time.    [] Continue per plan of care [x] Alter current plan (see comments)  [] Plan of care initiated [] Hold pending MD visit [] Discharge    Electronically signed by: Marisela Jackson, PTA [id number]

## 2022-06-10 DIAGNOSIS — I10 ESSENTIAL HYPERTENSION: ICD-10-CM

## 2022-06-10 DIAGNOSIS — I25.10 CORONARY ARTERY DISEASE INVOLVING NATIVE CORONARY ARTERY OF NATIVE HEART WITHOUT ANGINA PECTORIS: ICD-10-CM

## 2022-07-22 DIAGNOSIS — I25.10 CORONARY ARTERY DISEASE INVOLVING NATIVE CORONARY ARTERY OF NATIVE HEART WITHOUT ANGINA PECTORIS: ICD-10-CM

## 2022-07-22 DIAGNOSIS — I10 ESSENTIAL HYPERTENSION: ICD-10-CM

## 2022-07-25 RX ORDER — LISINOPRIL 20 MG/1
TABLET ORAL
Qty: 90 TABLET | Refills: 3 | Status: SHIPPED | OUTPATIENT
Start: 2022-07-25

## 2022-08-04 NOTE — PATIENT INSTRUCTIONS
Continue all medications. Schedule pulmonary function test. Call [phone] to schedule. Schedule echo.

## 2022-08-04 NOTE — PROGRESS NOTES
[address]  H+P  Consult  OP Visit  FU Visit   CC HX HPI   GEN  Doing well. SOB with exertion. SOB with any significant activity, works as . CAD  Ø CP. HTN  Ambulatory BP in good range. Ø HA/dizziness. CHOL  Last lipid reviewed. On max dose/tolerated statin. MED  Compliant with CV meds. Ø reported SA. HISTORY/ALLERGY/ROS   MEDHx  has a past medical history of Hypertension, Hyperthyroidism, and MI, old. SURGHx  has a past surgical history that includes Appendectomy; hernia repair; Cardiac catheterization; Diagnostic Cardiac Cath Lab Procedure (2016); shoulder surgery; and Knee arthroscopy (Right, 8/2/2021). SOCHx  reports that he quit smoking about 10 years ago. His smoking use included cigarettes. He has a 25.00 pack-year smoking history. He has never used smokeless tobacco. He reports current alcohol use. He reports that he does not use drugs. FAMHx family history includes Heart Disease in his mother; High Blood Pressure in his mother. ALLERG Patient has no known allergies. ROS Full ROS obtained and negative except as mentioned in HPI   MEDICATIONS   Current Outpatient Medications   Medication Sig Dispense Refill    lisinopril (PRINIVIL;ZESTRIL) 20 MG tablet TAKE 1 TABLET BY MOUTH EVERY DAY 90 tablet 3    metoprolol tartrate (LOPRESSOR) 25 MG tablet TAKE 1/2 TABLET BY MOUTH TWICE A DAY**NEED LAB WORK** 90 tablet 3    ibuprofen (ADVIL;MOTRIN) 800 MG tablet Take 1 tablet by mouth every 6 hours as needed for Pain 120 tablet 3    acetaminophen (APAP EXTRA STRENGTH) 500 MG tablet Take 2 tablets by mouth every 6 hours as needed for Pain 60 tablet 3    rosuvastatin (CRESTOR) 40 MG tablet Take 1 tablet by mouth daily (Patient taking differently: Take 20 mg by mouth daily ) 90 tablet 2    Acetaminophen (TYLENOL PO) Take by mouth      nitroGLYCERIN (NITROSTAT) 0.4 MG SL tablet up to max of 3 total doses.  If no relief after 1 dose, call 911. 25 tablet 3    Famotidine (PEPCID AC PO) Take by mouth Daily       Multiple Vitamins-Minerals (MULTIVITAMIN ADULT PO) Take by mouth Daily       Misc Natural Products (FIBER 7 PO) Take 2 tablets by mouth daily. aspirin 81 MG EC tablet Take 81 mg by mouth daily. No current facility-administered medications for this visit. PHYSICAL EXAM   Vitals /84 (Site: Right Upper Arm, Position: Sitting, Cuff Size: Medium Adult)   Pulse 82   Ht 6' (1.829 m)   Wt 227 lb 6.4 oz (103.1 kg)   SpO2 98%   BMI 30.84 kg/m²    Gen Alert, coop, no distress Heart  Rrr, no mrg   Head NC, AT, no abnorm Abd  Soft, NT, +BS, no mass, no OM   Eyes PER, conj/corn clear Ext  Ext nl, AT, no C/C/E   Nose Nares nl, no drain, NT Pulse 2+ and symmetric   Throat Lips, mucosa, tongue nl Skin Col/text/turg nl, no vis rash/les   Neck S/S, TM, NT, no bruit/JVD Psych Nl mood and affect   Lung CTA-B, unlabored, no DTP Lymph   No cervical or axillary LA   Ch wall NT, no deform Neuro  Nl gross M/S exam      CODING   SCI (67365) - CAD, HTN, CHOL, TOB  30-39 minutes preparing to see pt including review hx, tests, consults, perf exam, , educating pt, fam, caregiver, ordering meds/tests/procedures, referring and comm with pcps and other consultants, documenting info in EMR, interpreting results and communicating to fam and coordination of pt care. SCRIBE   Nurse - [address] [place] [address] Nani Bean, am scribing for and in the presence of Jelena Shaikh MD.   Nani Duarte 08/04/22 12:29 PM    Doctor - Provider Mera Christian is working as a scribe for and in the presence of keon Shaikh MD). Working as a scribe, Nani Ross may have prepopulated components of this note with my historical  intellectual property under my direct supervision. Any additions to this intellectual property were performed in my presence and at my direction. Furthermore, the content and accuracy of this note have been reviewed by keon Shaikh MD). 8/5/2022 11:24 AM   ASSESSMENT AND PLAN     *CAD   Date EF Results   Sx   SOB with exertion   Hx 2/16  SvPCI   LHC 2/16  3/18    PCI of Cx with GERSON, 50% LAD  40% LAD, Cx stent widely patent   GXT 3/16  40% LAD   TTE 2/16 55%    Plan   Continue aggressive medical treatment at doses above  PFTs for SOB and hx TOB  Echo to reassess EF   *HTN  Status Controlled, vitals and available ambulatory monitoring logs reviewed personally  Plan Counseled on diet/salt/exercise/weight, continue meds at doses above  *CHOL  Status  Uncontrolled with last LDL of 110(goal <70) and HDL of 44(5/22), labs reviewed personally  Plan Counseled on diet/exercise/weight, continue high-intensity statin, lipid/liver surveillance per PCP  *TOB  Status Quit  Plan Continued avoidance of 1st and 2nd hand smoke, counseled on risks/cessation   Check PFTs  *COMPLIANCE  Status Compliant  Plan Discussed importance of compliance with meds/diet/salt/exercise; avoid tob/alc/drugs; patient verbalized understanding  *FOLLOWUP  12

## 2022-08-05 ENCOUNTER — OFFICE VISIT (OUTPATIENT)
Dept: CARDIOLOGY CLINIC | Age: 62
End: 2022-08-05
Payer: COMMERCIAL

## 2022-08-05 VITALS
BODY MASS INDEX: 30.8 KG/M2 | SYSTOLIC BLOOD PRESSURE: 132 MMHG | DIASTOLIC BLOOD PRESSURE: 84 MMHG | WEIGHT: 227.4 LBS | OXYGEN SATURATION: 98 % | HEIGHT: 72 IN | HEART RATE: 82 BPM

## 2022-08-05 DIAGNOSIS — E78.00 HYPERCHOLESTEREMIA: ICD-10-CM

## 2022-08-05 DIAGNOSIS — Z72.0 TOBACCO ABUSE: ICD-10-CM

## 2022-08-05 DIAGNOSIS — I25.10 CORONARY ARTERY DISEASE INVOLVING NATIVE CORONARY ARTERY OF NATIVE HEART WITHOUT ANGINA PECTORIS: Primary | ICD-10-CM

## 2022-08-05 DIAGNOSIS — I10 ESSENTIAL HYPERTENSION: ICD-10-CM

## 2022-08-05 PROCEDURE — 99214 OFFICE O/P EST MOD 30 MIN: CPT | Performed by: INTERNAL MEDICINE

## 2022-08-05 RX ORDER — HYDROCHLOROTHIAZIDE 25 MG/1
TABLET ORAL
COMMUNITY
Start: 2022-07-20

## 2022-08-23 ENCOUNTER — PROCEDURE VISIT (OUTPATIENT)
Dept: CARDIOLOGY CLINIC | Age: 62
End: 2022-08-23
Payer: COMMERCIAL

## 2022-08-23 DIAGNOSIS — I25.10 CORONARY ARTERY DISEASE INVOLVING NATIVE CORONARY ARTERY OF NATIVE HEART WITHOUT ANGINA PECTORIS: ICD-10-CM

## 2022-08-23 LAB
LV EF: 60 %
LVEF MODALITY: NORMAL

## 2022-08-23 PROCEDURE — 93306 TTE W/DOPPLER COMPLETE: CPT | Performed by: INTERNAL MEDICINE

## 2022-11-29 NOTE — TELEPHONE ENCOUNTER
ISAIAH for pt to call to [facility] appt this week.  I have a spot on hold with HARRY for 05/07/2021 3:00pm for him Metronidazole Counseling:  I discussed with the patient the risks of metronidazole including but not limited to seizures, nausea/vomiting, a metallic taste in the mouth, nausea/vomiting and severe allergy.

## 2023-07-19 DIAGNOSIS — I25.10 CORONARY ARTERY DISEASE INVOLVING NATIVE CORONARY ARTERY OF NATIVE HEART WITHOUT ANGINA PECTORIS: ICD-10-CM

## 2023-07-19 DIAGNOSIS — I10 ESSENTIAL HYPERTENSION: ICD-10-CM

## 2023-08-02 DIAGNOSIS — I10 ESSENTIAL HYPERTENSION: ICD-10-CM

## 2023-08-02 DIAGNOSIS — I25.10 CORONARY ARTERY DISEASE INVOLVING NATIVE CORONARY ARTERY OF NATIVE HEART WITHOUT ANGINA PECTORIS: ICD-10-CM

## 2023-08-02 RX ORDER — LISINOPRIL 20 MG/1
TABLET ORAL
Qty: 90 TABLET | Refills: 3 | Status: SHIPPED | OUTPATIENT
Start: 2023-08-02

## 2023-08-07 ENCOUNTER — OFFICE VISIT (OUTPATIENT)
Dept: CARDIOLOGY CLINIC | Age: 63
End: 2023-08-07
Payer: COMMERCIAL

## 2023-08-07 VITALS
OXYGEN SATURATION: 96 % | HEART RATE: 64 BPM | BODY MASS INDEX: 30.27 KG/M2 | HEIGHT: 72 IN | WEIGHT: 223.5 LBS | SYSTOLIC BLOOD PRESSURE: 124 MMHG | DIASTOLIC BLOOD PRESSURE: 70 MMHG

## 2023-08-07 DIAGNOSIS — E78.2 MIXED HYPERLIPIDEMIA: ICD-10-CM

## 2023-08-07 DIAGNOSIS — I25.83 CORONARY ARTERY DISEASE DUE TO LIPID RICH PLAQUE: Primary | ICD-10-CM

## 2023-08-07 DIAGNOSIS — I10 PRIMARY HYPERTENSION: ICD-10-CM

## 2023-08-07 DIAGNOSIS — I25.10 CORONARY ARTERY DISEASE DUE TO LIPID RICH PLAQUE: Primary | ICD-10-CM

## 2023-08-07 PROCEDURE — 99214 OFFICE O/P EST MOD 30 MIN: CPT | Performed by: NURSE PRACTITIONER

## 2023-08-07 PROCEDURE — 3078F DIAST BP <80 MM HG: CPT | Performed by: NURSE PRACTITIONER

## 2023-08-07 PROCEDURE — 3074F SYST BP LT 130 MM HG: CPT | Performed by: NURSE PRACTITIONER

## 2023-08-07 RX ORDER — ROSUVASTATIN CALCIUM 20 MG/1
20 TABLET, COATED ORAL DAILY
COMMUNITY

## 2023-08-07 RX ORDER — OMEPRAZOLE 20 MG/1
20 CAPSULE, DELAYED RELEASE ORAL DAILY
COMMUNITY

## 2023-08-07 RX ORDER — IBUPROFEN 200 MG
200 TABLET ORAL EVERY 6 HOURS PRN
COMMUNITY
End: 2023-08-07 | Stop reason: ALTCHOICE

## 2023-08-07 NOTE — PROGRESS NOTES
days/week  Discussed Low saturated fat/PATY diet. Thank you for allowing to us to participate in the care of [address] [place] [address].     MARLA Pérez    Documentation of today's visit sent to PCP

## 2023-11-30 ENCOUNTER — TELEPHONE (OUTPATIENT)
Dept: CARDIOLOGY CLINIC | Age: 63
End: 2023-11-30

## 2024-05-06 ENCOUNTER — TELEPHONE (OUTPATIENT)
Dept: CARDIOLOGY CLINIC | Age: 64
End: 2024-05-06

## 2024-05-06 NOTE — TELEPHONE ENCOUNTER
Per LES schedule pt at 7.30 for EKG. But no opening till 9. Called pt, spoke to wife, pt scheduled at 9am tomorrow for EKG. Wife v/u.

## 2024-05-07 ENCOUNTER — NURSE ONLY (OUTPATIENT)
Dept: CARDIOLOGY CLINIC | Age: 64
End: 2024-05-07
Payer: MEDICARE

## 2024-05-07 DIAGNOSIS — Z01.818 PRE-OP EXAM: Primary | ICD-10-CM

## 2024-05-07 PROCEDURE — 93000 ELECTROCARDIOGRAM COMPLETE: CPT | Performed by: INTERNAL MEDICINE

## 2024-08-06 ENCOUNTER — OFFICE VISIT (OUTPATIENT)
Dept: CARDIOLOGY CLINIC | Age: 64
End: 2024-08-06
Payer: MEDICARE

## 2024-08-06 VITALS
HEIGHT: 72 IN | WEIGHT: 226.2 LBS | OXYGEN SATURATION: 94 % | BODY MASS INDEX: 30.64 KG/M2 | SYSTOLIC BLOOD PRESSURE: 126 MMHG | DIASTOLIC BLOOD PRESSURE: 70 MMHG | HEART RATE: 88 BPM

## 2024-08-06 DIAGNOSIS — I25.10 CORONARY ARTERY DISEASE DUE TO LIPID RICH PLAQUE: Primary | ICD-10-CM

## 2024-08-06 DIAGNOSIS — E78.2 MIXED HYPERLIPIDEMIA: ICD-10-CM

## 2024-08-06 DIAGNOSIS — I10 PRIMARY HYPERTENSION: ICD-10-CM

## 2024-08-06 DIAGNOSIS — I25.10 CORONARY ARTERY DISEASE INVOLVING NATIVE CORONARY ARTERY OF NATIVE HEART WITHOUT ANGINA PECTORIS: ICD-10-CM

## 2024-08-06 DIAGNOSIS — I25.83 CORONARY ARTERY DISEASE DUE TO LIPID RICH PLAQUE: Primary | ICD-10-CM

## 2024-08-06 DIAGNOSIS — I10 ESSENTIAL HYPERTENSION: ICD-10-CM

## 2024-08-06 PROCEDURE — 1123F ACP DISCUSS/DSCN MKR DOCD: CPT | Performed by: NURSE PRACTITIONER

## 2024-08-06 PROCEDURE — 3074F SYST BP LT 130 MM HG: CPT | Performed by: NURSE PRACTITIONER

## 2024-08-06 PROCEDURE — 3078F DIAST BP <80 MM HG: CPT | Performed by: NURSE PRACTITIONER

## 2024-08-06 PROCEDURE — 99214 OFFICE O/P EST MOD 30 MIN: CPT | Performed by: NURSE PRACTITIONER

## 2024-08-06 RX ORDER — ASPIRIN 81 MG/1
81 TABLET, CHEWABLE ORAL DAILY
COMMUNITY

## 2024-08-06 RX ORDER — NITROGLYCERIN 0.4 MG/1
TABLET SUBLINGUAL
Qty: 25 TABLET | Refills: 3 | Status: SHIPPED | OUTPATIENT
Start: 2024-08-06

## 2024-08-06 NOTE — PROGRESS NOTES
reveals the following:    Last Echo: Aug '22  Summary   Normal left ventricle size, wall thickness and systolic function with an   estimated ejection fraction of 60%. No regional wall motion abnormalities   are seen.   Aortic sclerosis.   E/e\"= 9. Diastolic filling parameters suggests grade I diastolic   dysfunction.   IVC size is normal (<2.1cm) and collapses > 50% with respiration consistent   with normal RA pressure (3mmHg).    Last Stress Test: Nov '07: abnormal inf fixed defect consistent with artifact. A small old inf wall MI cannot be excluded. LVF 49%    Assessment:      Diagnosis Orders   1. Coronary artery disease due to lipid rich plaque   ~stable : denies indigestion angina equivalent; denies CP  ~s/p PTCA CX / OM-1 '16  ~BB / statin / ASA  ~normal wall motion on echo , EF 60% on echo from Aug '22       2. Primary hypertension   ~controlled  ~lisinopril 20 mg daily/ metoprolol 12.5 mg bid / HCTZ 25 mg daily       3. Mixed hyperlipidemia   ~not recently checked   ~LDL not at goal on profile from Jan '23  ~crestor 20 mg daily          I had the opportunity to review the clinical symptoms and presentation of Scott Aldridge.   Plan:     Fasting lipid profile as routine  F/U in one year with Dr. Lomeli     Overall the patient is stable from CV standpoint    I have addresed the patient's cardiac risk factors and adjusted pharmacologic treatment as needed. In addition, I have reinforced the need for patient directed risk factor modification.    Further evaluation will be based upon the patient's clinical course and testing results.    All questions and concerns were addressed to the patient.. Alternatives to my treatment were discussed.      The patient is not currently smoking: remote    Patient is on a beta-blocker  Patient is on an ace-i/ARB  Patient is on a statin    Antiplatelet therapy has been recommended / prescribed for this patient. Education conducted on adverse reactions including bleeding was

## 2024-08-06 NOTE — TELEPHONE ENCOUNTER
Received refill request for metoprolol tartrate (LOPRESSOR) 25 MG  from [facility] pharmacy.     Last OV: 8/7/23    Next OV:     Last Labs:     Last Filled: 7/20/23

## 2025-02-17 ENCOUNTER — TELEPHONE (OUTPATIENT)
Dept: CARDIOLOGY CLINIC | Age: 65
End: 2025-02-17

## 2025-02-17 NOTE — TELEPHONE ENCOUNTER
CARDIAC CLEARANCE     What type of procedure are you having?   Bladder Biopsy     Which physician is performing your procedure?  Dr. Evangelist Mays    When is your procedure scheduled for?   02//18/25    [facility]    Are you taking Blood Thinners?  Yes   If so what? (Name/dose/frequesncy)   Asprin 81mg     Does the surgeon want you to stop your blood thinner?  If so for how long?  Yes -  hold 7 days prior    Phone Number and Contact Name for Physicians office:  Carla Blue [phone]  Fax number to send information:  [phone]    NOTE: NPTS saw the Pt on 08/06/2024 -  FYI

## 2025-08-02 LAB
CHOLESTEROL, TOTAL: 162 MG/DL
HDLC SERPL-MCNC: 50 MG/DL
LDL CHOLESTEROL: 95 MG/DL
NONHDLC SERPL-MCNC: 112 MG/DL
TRIGL SERPL-MCNC: 86 MG/DL

## 2025-08-06 ENCOUNTER — OFFICE VISIT (OUTPATIENT)
Dept: CARDIOLOGY CLINIC | Age: 65
End: 2025-08-06
Payer: MEDICARE

## 2025-08-06 VITALS
WEIGHT: 223.9 LBS | BODY MASS INDEX: 30.33 KG/M2 | HEART RATE: 73 BPM | DIASTOLIC BLOOD PRESSURE: 70 MMHG | HEIGHT: 72 IN | OXYGEN SATURATION: 98 % | SYSTOLIC BLOOD PRESSURE: 120 MMHG

## 2025-08-06 DIAGNOSIS — E78.2 MIXED HYPERLIPIDEMIA: ICD-10-CM

## 2025-08-06 DIAGNOSIS — I25.10 CORONARY ARTERY DISEASE INVOLVING NATIVE CORONARY ARTERY OF NATIVE HEART WITHOUT ANGINA PECTORIS: Primary | ICD-10-CM

## 2025-08-06 DIAGNOSIS — I10 PRIMARY HYPERTENSION: ICD-10-CM

## 2025-08-06 PROCEDURE — 1159F MED LIST DOCD IN RCRD: CPT | Performed by: NURSE PRACTITIONER

## 2025-08-06 PROCEDURE — 99214 OFFICE O/P EST MOD 30 MIN: CPT | Performed by: NURSE PRACTITIONER

## 2025-08-06 PROCEDURE — 3078F DIAST BP <80 MM HG: CPT | Performed by: NURSE PRACTITIONER

## 2025-08-06 PROCEDURE — 1160F RVW MEDS BY RX/DR IN RCRD: CPT | Performed by: NURSE PRACTITIONER

## 2025-08-06 PROCEDURE — 1123F ACP DISCUSS/DSCN MKR DOCD: CPT | Performed by: NURSE PRACTITIONER

## 2025-08-06 PROCEDURE — 3074F SYST BP LT 130 MM HG: CPT | Performed by: NURSE PRACTITIONER

## 2025-08-06 RX ORDER — ROSUVASTATIN CALCIUM 40 MG/1
40 TABLET, COATED ORAL EVERY EVENING
COMMUNITY

## 2025-08-12 ENCOUNTER — TELEPHONE (OUTPATIENT)
Dept: CARDIOLOGY CLINIC | Age: 65
End: 2025-08-12

## (undated) DEVICE — SUTURE MCRYL SZ 4-0 L27IN ABSRB UD L19MM PS-2 1/2 CIR PRIM Y426H

## (undated) DEVICE — Device

## (undated) DEVICE — SYRINGE MED 5ML STD CLR PLAS LUERLOCK TIP N CTRL DISP

## (undated) DEVICE — SUTURE ETHLN SZ 4-0 L18IN NONABSORBABLE BLK L19MM PS-2 3/8 1667H

## (undated) DEVICE — 3.5 MM FULL RADIUS ELITE STRAIGHT                                    DISPOSABLE BLADES, BEIGE,PACKAGED 6                                    PER BOX, STERILE

## (undated) DEVICE — HYPODERMIC SAFETY NEEDLE: Brand: MAGELLAN

## (undated) DEVICE — (6) MINITAPE (BLUE) 39.5: Brand: MINITAPE

## (undated) DEVICE — DYONICS 25 PATIENT TUBE SET MUST                                    BE USED WITH 7211007, 12 PER BOX

## (undated) DEVICE — BANDAGE ADH W1XL3IN NAT FAB WVN FLX DURABLE N ADH PD SEAL

## (undated) DEVICE — APPLICATOR PREP 26ML 0.7% IOD POVACRYLEX 74% ISO ALC ST

## (undated) DEVICE — SYRINGE, LUER LOCK, 60ML: Brand: MEDLINE

## (undated) DEVICE — MASC TURNOVER KIT: Brand: MEDLINE INDUSTRIES, INC.

## (undated) DEVICE — SHEET,DRAPE,53X77,STERILE: Brand: MEDLINE

## (undated) DEVICE — 3M™ STERI-DRAPE™ U-DRAPE 1015: Brand: STERI-DRAPE™

## (undated) DEVICE — GLOVE ORANGE PI 8   MSG9080

## (undated) DEVICE — GAUZE,SPONGE,4"X4",8PLY,STRL,LF,10/TRAY: Brand: MEDLINE

## (undated) DEVICE — INTENDED FOR TISSUE SEPARATION, AND OTHER PROCEDURES THAT REQUIRE A SHARP SURGICAL BLADE TO PUNCTURE OR CUT.: Brand: BARD-PARKER ® STAINLESS STEEL BLADES

## (undated) DEVICE — LIGHT HANDLE: Brand: DEVON

## (undated) DEVICE — WEREWOLF FLOW 50 COBLATION WAND: Brand: COBLATION

## (undated) DEVICE — PENCIL ES L3M BTTN SWCH S STL HEX LOK BLDE ELECTRD HOLSTER

## (undated) DEVICE — SUTURE 2-0 VCRL CTD FS-1 J443H

## (undated) DEVICE — DYONICS 25 DAY TUBE SET MUST BE                                    USED WITH 7211008, 3 PER BOX